# Patient Record
Sex: MALE | Race: BLACK OR AFRICAN AMERICAN | Employment: FULL TIME | RURAL
[De-identification: names, ages, dates, MRNs, and addresses within clinical notes are randomized per-mention and may not be internally consistent; named-entity substitution may affect disease eponyms.]

---

## 2017-02-08 ENCOUNTER — OFFICE VISIT (OUTPATIENT)
Dept: FAMILY MEDICINE CLINIC | Age: 57
End: 2017-02-08

## 2017-02-08 VITALS
RESPIRATION RATE: 19 BRPM | WEIGHT: 169.2 LBS | SYSTOLIC BLOOD PRESSURE: 139 MMHG | BODY MASS INDEX: 26.56 KG/M2 | OXYGEN SATURATION: 96 % | HEIGHT: 67 IN | HEART RATE: 82 BPM | TEMPERATURE: 98.1 F | DIASTOLIC BLOOD PRESSURE: 90 MMHG

## 2017-02-08 DIAGNOSIS — H66.001 ACUTE SUPPURATIVE OTITIS MEDIA OF RIGHT EAR WITHOUT SPONTANEOUS RUPTURE OF TYMPANIC MEMBRANE, RECURRENCE NOT SPECIFIED: ICD-10-CM

## 2017-02-08 DIAGNOSIS — J06.9 VIRAL UPPER RESPIRATORY TRACT INFECTION: Primary | ICD-10-CM

## 2017-02-08 DIAGNOSIS — H81.11 BPV (BENIGN POSITIONAL VERTIGO), RIGHT: ICD-10-CM

## 2017-02-08 RX ORDER — AMOXICILLIN 875 MG/1
875 TABLET, FILM COATED ORAL 2 TIMES DAILY
Qty: 20 TAB | Refills: 0 | Status: SHIPPED | OUTPATIENT
Start: 2017-02-08 | End: 2017-02-18

## 2017-02-08 NOTE — PROGRESS NOTES
Ar Quinonez is a 55 y.o. male who presents to the office today with the following:  Chief Complaint   Patient presents with    Dizziness     dizziness started last thursday after dental work       Allergies   Allergen Reactions    Onion Itching     Throat starts itching       Current Outpatient Prescriptions   Medication Sig    amoxicillin (AMOXIL) 875 mg tablet Take 1 Tab by mouth two (2) times a day for 10 days.  hydroCHLOROthiazide (HYDRODIURIL) 25 mg tablet Take 1 Tab by mouth daily. Indications: Hypertension     No current facility-administered medications for this visit. Past Medical History   Diagnosis Date    Essential hypertension, benign 2015       History reviewed. No pertinent past surgical history. History   Smoking Status    Current Every Day Smoker   Smokeless Tobacco    Never Used       History reviewed. No pertinent family history. History of Present Illness:    Patient here for evaluation of dizziness    Patient states he had dental work done last Thursday. He had his teeth cleaned and descaled and several fillings in his upper teeth. While reclined in the chair he began to feel a little dizzy like the room was spinning. Since that time he feels a bit lightheaded. This is worse when he is walking or with rapid head movements. He is beginning to get a vague pressure sensation left ear and sinus area. He states the room is not spinning at this point. Patient states he feels like he is getting a cold with some stuffy nose    Patient had very similar symptoms a year ago. They began just like this and then began to get worse. He went to see his physician. He was diagnosed with an ear infection and treated with antibiotics. The symptoms resolved quite quickly. He states he feels exactly the same way this time. At that time he was also given some meclizine which actually made him feel worse. Patient has no other neurologic complaints.   He has controlled hypertension. No cardiac complaints chest pain palpitations. Recent lab work acceptable    He does not get flu shots. He does not smoke. We discussed the importance of smoking cessation    Review of Systems:    Review of systems negative except as noted above      Physical Exam:  Visit Vitals    /90 (BP 1 Location: Right arm, BP Patient Position: Sitting)    Pulse 82    Temp 98.1 °F (36.7 °C) (Temporal)    Resp 19    Ht 5' 7\" (1.702 m)    Wt 169 lb 3.2 oz (76.7 kg)    SpO2 96%    BMI 26.5 kg/m2     Vitals:    02/08/17 1547   BP: 139/90   BP 1 Location: Right arm   BP Patient Position: Sitting   Pulse: 82   Resp: 19   Temp: 98.1 °F (36.7 °C)   TempSrc: Temporal   SpO2: 96%   Weight: 169 lb 3.2 oz (76.7 kg)   Height: 5' 7\" (1.702 m)    Patient no acute distress vitals as above. Blood pressure controlled  Head was normocephalic  Eyes PERRLA. EOMs full. He had some very minimal horizontal nystagmus on full right lateral gaze  External ears were normal.  Ear canals normal.  TMs were clear on the left. The right TM had some serous fluid and a very faint pink tinge  Nose external ears normal.  No lesions. Plus congestion   with yellow rhinorrhea  Sinuses were nontender  OP Mucosa normal.  Pharynx normal.  No erythema or exudate. Structures midline. Patient with recent dental work  Neck no nodes no masses, no bruits  Chest is clear no wheezes rhonchi or rales. Good air exchange  Cor regular rate and rhythm no murmurs  Cranial nerves II through XII intact except as noted above. Gait and Romberg normal    Assessment/Plan:  1. Viral upper respiratory tract infection  I suspect patient had viral URI. Now developing a mild ear infection which is leading to his benign positional vertigo. He responded very well asked her to amoxicillin. I am treating him again with antibiotics. He declines Antivert. He will notify us and follow-up if his symptoms persist    2.  Acute suppurative otitis media of right ear without spontaneous rupture of tympanic membrane, recurrence not specified    - amoxicillin (AMOXIL) 875 mg tablet; Take 1 Tab by mouth two (2) times a day for 10 days. Dispense: 20 Tab; Refill: 0    3. BPV (benign positional vertigo), right      Patient Instructions   Home, rest, lots of fluids, vaporizer if needed. Finish antibiotics  Stand and turn head slowly  Follow up if worse or not better next 3-5 days. Patient plans to keep routine follow-up at the [facility] office          Continue current therapy plan except for indicated above. Verbal and written instructions (see AVS) provided.  Patient expresses understanding of diagnosis and treatment plan. Follow-up Disposition: Not on [address] [place] [address].  Kassie Jane MD

## 2017-02-08 NOTE — PROGRESS NOTES
Chief Complaint   Patient presents with    Dizziness     dizziness started last thursday after dental work     No medications taken yet. Marj Crow LPN

## 2017-02-08 NOTE — MR AVS SNAPSHOT
Visit Information Date & Time Provider Department Dept. Phone Encounter #  
 2/8/2017  3:00 PM Willis Doss MD [address] [place] [address] [phone] [id number] Upcoming Health Maintenance Date Due DTaP/Tdap/Td series (1 - Tdap) 5/25/1982 FOBT Q 1 YEAR AGE 50-75 5/25/2011 Allergies as of 2/8/2017  Review Complete On: 2/8/2017 By: Willis Doss MD  
  
 Severity Noted Reaction Type Reactions Onion  02/08/2017    Itching Throat starts itching Current Immunizations  Never Reviewed No immunizations on file. Not reviewed this visit You Were Diagnosed With   
  
 Codes Comments Viral upper respiratory tract infection    -  Primary ICD-10-CM: J06.9, B97.89 ICD-9-CM: 465.9 Acute suppurative otitis media of right ear without spontaneous rupture of tympanic membrane, recurrence not specified     ICD-10-CM: H66.001 ICD-9-CM: 382.00   
 BPV (benign positional vertigo), right     ICD-10-CM: H81.11 
ICD-9-CM: 386.11 Vitals BP Pulse Temp Resp Height(growth percentile) 139/90 (BP 1 Location: Right arm, BP Patient Position: Sitting) 82 98.1 °F (36.7 °C) (Temporal) 19 5' 7\" (1.702 m) Weight(growth percentile) SpO2 BMI Smoking Status 169 lb 3.2 oz (76.7 kg) 96% 26.5 kg/m2 Current Every Day Smoker Vitals History BMI and BSA Data Body Mass Index Body Surface Area  
 26.5 kg/m 2 1.9 m 2 Preferred Pharmacy Pharmacy Name Phone [address], [address] [facility] OF  [address] [facility] Cleo Novak [phone] Your Updated Medication List  
  
   
This list is accurate as of: 2/8/17  4:09 PM.  Always use your most recent med list.  
  
  
  
  
 amoxicillin 875 mg tablet Commonly known as:  AMOXIL Take 1 Tab by mouth two (2) times a day for 10 days. hydroCHLOROthiazide 25 mg tablet Commonly known as:  HYDRODIURIL  
 Take 1 Tab by mouth daily. Indications: Hypertension Prescriptions Sent to Pharmacy Refills  
 amoxicillin (AMOXIL) 875 mg tablet 0 Sig: Take 1 Tab by mouth two (2) times a day for 10 days. Class: Normal  
 [facility] [facility] [address]  #: [phone] Route: Oral  
  
Patient Instructions Home, rest, lots of fluids, vaporizer if needed. Finish antibiotics Stand and turn head slowly Follow up if worse or not better next 3-5 days. Introducing [facility] & HEALTH SERVICES! Jailene Hall introduces [employer] patient portal. Now you can access parts of your medical record, email your doctor's office, and request medication refills online. 1. In your internet browser, go to [email or web address] 2. Click on the First Time User? Click Here link in the Sign In box. You will see the New Member Sign Up page. 3. Enter your [employer] Access Code exactly as it appears below. You will not need to use this code after youve completed the sign-up process. If you do not sign up before the expiration date, you must request a new code. · [employer] Access Code: [email or web address] Expires: 5/9/2017  3:49 PM 
 
4. Enter the last four digits of your Social Security Number (xxxx) and Date of Birth (mm/dd/yyyy) as indicated and click Submit. You will be taken to the next sign-up page. 5. Create a Kimble ID. This will be your Kimble login ID and cannot be changed, so think of one that is secure and easy to remember. 6. Create a Kimble password. You can change your password at any time. 7. Enter your Password Reset Question and Answer. This can be used at a later time if you forget your password. 8. Enter your e-mail address. You will receive e-mail notification when new information is available in [address] 9. Click Sign Up. You can now view and download portions of your medical record. 10. Click the Download Summary menu link to download a portable copy of your medical information. If you have questions, please visit the Frequently Asked Questions section of the [employer] website. Remember, [employer] is NOT to be used for urgent needs. For medical emergencies, dial 911. Now available from your iPhone and Android! Please provide this summary of care documentation to your next provider. If you have any questions after today's visit, please call [phone].

## 2017-02-08 NOTE — PATIENT INSTRUCTIONS
Home, rest, lots of fluids, vaporizer if needed. Finish antibiotics  Stand and turn head slowly  Follow up if worse or not better next 3-5 days.

## 2018-02-01 ENCOUNTER — OFFICE VISIT (OUTPATIENT)
Dept: FAMILY MEDICINE CLINIC | Age: 58
End: 2018-02-01

## 2018-02-01 VITALS
DIASTOLIC BLOOD PRESSURE: 106 MMHG | OXYGEN SATURATION: 96 % | HEIGHT: 67 IN | WEIGHT: 178.25 LBS | SYSTOLIC BLOOD PRESSURE: 154 MMHG | RESPIRATION RATE: 18 BRPM | HEART RATE: 89 BPM | TEMPERATURE: 98.3 F | BODY MASS INDEX: 27.98 KG/M2

## 2018-02-01 DIAGNOSIS — I10 ESSENTIAL HYPERTENSION, BENIGN: Primary | ICD-10-CM

## 2018-02-01 DIAGNOSIS — Z91.199 NONCOMPLIANCE: ICD-10-CM

## 2018-02-01 RX ORDER — POTASSIUM CHLORIDE 750 MG/1
10 TABLET, EXTENDED RELEASE ORAL DAILY
Qty: 90 TAB | Refills: 1 | Status: SHIPPED | OUTPATIENT
Start: 2018-02-01 | End: 2019-08-16 | Stop reason: DRUGHIGH

## 2018-02-01 RX ORDER — HYDROCHLOROTHIAZIDE 50 MG/1
50 TABLET ORAL DAILY
Qty: 90 TAB | Refills: 1 | Status: SHIPPED | OUTPATIENT
Start: 2018-02-01 | End: 2019-08-16 | Stop reason: SDUPTHER

## 2018-02-01 NOTE — MR AVS SNAPSHOT
[address] [phone] Patient: Britney Aguirre MRN: [id number] :1960 Visit Information Date & Time Provider Department Dept. Phone Encounter #  
 2018  2:40 PM Vannesa Desir  [address] [facility] [phone] [id number] Upcoming Health Maintenance Date Due FOBT Q 1 YEAR AGE 50-75 2011 DTaP/Tdap/Td series (2 - Td) 2028 Allergies as of 2018  Review Complete On: 2018 By: Vannesa Desir MD  
  
 Severity Noted Reaction Type Reactions Onion  2017    Itching Throat starts itching Current Immunizations  Never Reviewed No immunizations on file. Not reviewed this visit Vitals BP Pulse Temp Resp Height(growth percentile) Weight(growth percentile) (!) 154/106 (BP 1 Location: Left arm, BP Patient Position: Sitting) 89 98.3 °F (36.8 °C) (Oral) 18 5' 7\" (1.702 m) 178 lb 4 oz (80.9 kg) SpO2 BMI Smoking Status 96% 27.92 kg/m2 Current Every Day Smoker Vitals History BMI and BSA Data Body Mass Index Body Surface Area  
 27.92 kg/m 2 1.96 m 2 Preferred Pharmacy Pharmacy Name Phone [address] AT [facility] OF [address] [facility] Sanjuana Robertson [phone] Your Updated Medication List  
  
   
This list is accurate as of: 18  4:19 PM.  Always use your most recent med list.  
  
  
  
  
 hydroCHLOROthiazide 25 mg tablet Commonly known as:  HYDRODIURIL Take 1 Tab by mouth daily. Indications: Hypertension Introducing [facility] & HEALTH SERVICES! [employer] introduces [employer] patient portal. Now you can access parts of your medical record, email your doctor's office, and request medication refills online. 1. In your internet browser, go to [email or web address] [employer] 2. Click on the First Time User? Click Here link in the Sign In box. You will see the New Member Sign Up page. 3. Enter your [employer] Access Code exactly as it appears below. You will not need to use this code after youve completed the sign-up process. If you do not sign up before the expiration date, you must request a new code. · [employer] Access Code: [email or web address] Expires: 5/2/2018  4:19 PM 
 
4. Enter the last four digits of your Social Security Number (xxxx) and Date of Birth (mm/dd/yyyy) as indicated and click Submit. You will be taken to the next sign-up page. 5. Create a [employer] ID. This will be your [employer] login ID and cannot be changed, so think of one that is secure and easy to remember. 6. Create a [employer] password. You can change your password at any time. 7. Enter your Password Reset Question and Answer. This can be used at a later time if you forget your password. 8. Enter your e-mail address. You will receive e-mail notification when new information is available in [address] 9. Click Sign Up. You can now view and download portions of your medical record. 10. Click the Download Summary menu link to download a portable copy of your medical information. If you have questions, please visit the Frequently Asked Questions section of the [employer] website. Remember, [employer] is NOT to be used for urgent needs. For medical emergencies, dial 911. Now available from your iPhone and Android! Please provide this summary of care documentation to your next provider. If you have any questions after today's visit, please call [phone].

## 2018-02-01 NOTE — PROGRESS NOTES
Travis Morales is a 65 y.o. male who presents with the following:  Chief Complaint   Patient presents with    Hypertension    Neck Pain    Headache       Hypertension    The history is provided by the patient (Patient without chest pain shortness of breath nor edema but has been off of his medicine for over a year because he was not symptomatic). Associated symptoms include headaches and neck pain. Pertinent negatives include no chest pain, no orthopnea, no palpitations, no malaise/fatigue, no blurred vision, no tinnitus, no peripheral edema, no dizziness and no shortness of breath. Neck Pain   Associated symptoms include headaches. Pertinent negatives include no chest pain, no abdominal pain and no shortness of breath. Headache   Associated symptoms include headaches. Pertinent negatives include no chest pain, no abdominal pain and no shortness of breath. Allergies   Allergen Reactions    Onion Itching     Throat starts itching       Current Outpatient Prescriptions   Medication Sig    hydroCHLOROthiazide (HYDRODIURIL) 50 mg tablet Take 1 Tab by mouth daily. Indications: hypertension    potassium chloride (KLOR-CON) 10 mEq tablet Take 1 Tab by mouth daily. No current facility-administered medications for this visit. Past Medical History:   Diagnosis Date    Essential hypertension, benign 2015       No past surgical history on file. No family history on file. Social History     Social History    Marital status:      Spouse name: N/A    Number of children: N/A    Years of education: N/A     Social History Main Topics    Smoking status: Current Every Day Smoker    Smokeless tobacco: Never Used    Alcohol use No    Drug use: No    Sexual activity: Yes     Partners: Female     Birth control/ protection: None     Other Topics Concern    None     Social History Narrative       Review of Systems   Constitutional: Negative for chills, fever, malaise/fatigue and weight loss. HENT: Negative for congestion, hearing loss, sore throat and tinnitus. Eyes: Negative for blurred vision, pain and discharge. Respiratory: Negative for cough, shortness of breath and wheezing. Cardiovascular: Negative for chest pain, palpitations, orthopnea, claudication and leg swelling. Gastrointestinal: Negative for abdominal pain, constipation and heartburn. Genitourinary: Negative for dysuria, frequency and urgency. Musculoskeletal: Positive for neck pain. Negative for falls, joint pain and myalgias. Skin: Negative for itching and rash. Neurological: Positive for headaches. Negative for dizziness, tingling and tremors. Endo/Heme/Allergies: Negative for environmental allergies and polydipsia. Psychiatric/Behavioral: Negative for depression and substance abuse. The patient is not nervous/anxious. Visit Vitals    BP (!) 154/106 (BP 1 Location: Left arm, BP Patient Position: Sitting)    Pulse 89    Temp 98.3 °F (36.8 °C) (Oral)    Resp 18    Ht 5' 7\" (1.702 m)    Wt 178 lb 4 oz (80.9 kg)    SpO2 96%    BMI 27.92 kg/m2     Physical Exam   Constitutional: He is oriented to person, place, and time and well-developed, well-nourished, and in no distress. HENT:   Head: Normocephalic and atraumatic. Nose: Nose normal.   Mouth/Throat: Oropharynx is clear and moist.   Eyes: Conjunctivae and EOM are normal. Pupils are equal, round, and reactive to light. Neck: Normal range of motion. Neck supple. No JVD present. No tracheal deviation present. No thyromegaly present. Cardiovascular: Normal rate, regular rhythm, normal heart sounds and intact distal pulses. Exam reveals no gallop and no friction rub. No murmur heard. Pulmonary/Chest: Effort normal and breath sounds normal. No respiratory distress. He has no wheezes. He has no rales. He exhibits no tenderness. Abdominal: Soft. Bowel sounds are normal. He exhibits no distension and no mass. There is no tenderness.  There is no rebound and no guarding. Musculoskeletal: Normal range of motion. He exhibits no edema or tenderness. Lymphadenopathy:     He has no cervical adenopathy. Neurological: He is alert and oriented to person, place, and time. He has normal reflexes. No cranial nerve deficit. He exhibits normal muscle tone. Gait normal. Coordination normal.   Skin: Skin is warm and dry. No rash noted. No erythema. Psychiatric: Mood, memory, affect and judgment normal.   Vitals reviewed. ICD-10-CM ICD-9-CM    1. Essential hypertension, benign I10 401.1 hydroCHLOROthiazide (HYDRODIURIL) 50 mg tablet      potassium chloride (KLOR-CON) 10 mEq tablet      METABOLIC PANEL, COMPREHENSIVE      LIPID PANEL   2. Noncompliance Z91.19 V15.81 hydroCHLOROthiazide (HYDRODIURIL) 50 mg tablet      potassium chloride (KLOR-CON) 10 mEq tablet       Orders Placed This Encounter    METABOLIC PANEL, COMPREHENSIVE     Standing Status:   Future     Standing Expiration Date:   8/1/2018    LIPID PANEL     Standing Status:   Future     Standing Expiration Date:   8/1/2018    hydroCHLOROthiazide (HYDRODIURIL) 50 mg tablet     Sig: Take 1 Tab by mouth daily. Indications: hypertension     Dispense:  90 Tab     Refill:  1    potassium chloride (KLOR-CON) 10 mEq tablet     Sig: Take 1 Tab by mouth daily. Dispense:  90 Tab     Refill:  1   Patient will return fasting to have his lab work done and then see me in 1 month to recheck his pressure to make sure it has come down.     Follow-up Disposition: Not on Radha De La Torre MD

## 2018-02-01 NOTE — LETTER
NOTIFICATION RETURN TO WORK / SCHOOL 
 
2/1/2018 4:20 PM 
 
Mr. Nissa Yu [address] [address] To Whom It May Concern: 
 
Nissa Yu is currently under the care of Jenny Rincon. He will return to work/school on: 2/2/2018 If there are questions or concerns please have the patient contact our office. Sincerely, Estefania Avalos MD

## 2018-07-02 ENCOUNTER — OFFICE VISIT (OUTPATIENT)
Dept: FAMILY MEDICINE CLINIC | Age: 58
End: 2018-07-02

## 2018-07-02 VITALS
WEIGHT: 171.6 LBS | HEART RATE: 96 BPM | RESPIRATION RATE: 18 BRPM | DIASTOLIC BLOOD PRESSURE: 90 MMHG | HEIGHT: 67 IN | SYSTOLIC BLOOD PRESSURE: 133 MMHG | BODY MASS INDEX: 26.93 KG/M2 | OXYGEN SATURATION: 97 %

## 2018-07-02 DIAGNOSIS — I10 ESSENTIAL HYPERTENSION, BENIGN: Primary | ICD-10-CM

## 2018-07-02 DIAGNOSIS — M76.891 TENDINITIS OF RIGHT HIP FLEXOR: ICD-10-CM

## 2018-07-02 DIAGNOSIS — M70.71 ISCHIAL BURSITIS OF RIGHT SIDE: ICD-10-CM

## 2018-07-02 DIAGNOSIS — Z91.199 NONCOMPLIANCE: ICD-10-CM

## 2018-07-02 RX ORDER — NAPROXEN 500 MG/1
500 TABLET ORAL 2 TIMES DAILY WITH MEALS
Qty: 60 TAB | Refills: 2 | Status: SHIPPED | OUTPATIENT
Start: 2018-07-02 | End: 2019-08-16 | Stop reason: ALTCHOICE

## 2018-07-02 RX ORDER — AMLODIPINE BESYLATE 10 MG/1
10 TABLET ORAL DAILY
Qty: 90 TAB | Refills: 1 | Status: SHIPPED | OUTPATIENT
Start: 2018-07-02 | End: 2019-08-16 | Stop reason: SINTOL

## 2018-07-02 NOTE — PROGRESS NOTES
1. Have you been to the ER, urgent care clinic since your last visit? Hospitalized since your last visit? No    2. Have you seen or consulted any other health care providers outside of the [address] [place] [address] since your last visit? Include any pap smears or colon screening.  No

## 2018-07-02 NOTE — PROGRESS NOTES
Isabelle Marin is a 63 y.o. male who presents with the following:  Chief Complaint   Patient presents with    Leg Pain     right, x1 month       Leg Pain    The history is provided by the patient (Patient complains of pain in the right hip when walking that is been going on for 4 weeks and when he is standing and walking is running about a 5/10 on the pain scale. Pain is deep and achy). This is a new problem. The problem has been gradually worsening. The pain is present in the right hip. Pertinent negatives include no numbness, full range of motion, no stiffness, no tingling, no itching, no back pain and no neck pain. Hypertension    The history is provided by the patient (Patient has a long history of noncompliance with his hypertension. Patient currently is not taking his potassium chloride. ). Pertinent negatives include no chest pain, no orthopnea, no palpitations, no malaise/fatigue, no blurred vision, no headaches, no tinnitus, no neck pain, no dizziness and no shortness of breath. Allergies   Allergen Reactions    Onion Itching     Throat starts itching       Current Outpatient Prescriptions   Medication Sig    naproxen (NAPROSYN) 500 mg tablet Take 1 Tab by mouth two (2) times daily (with meals).  amLODIPine (NORVASC) 10 mg tablet Take 1 Tab by mouth daily.  hydroCHLOROthiazide (HYDRODIURIL) 50 mg tablet Take 1 Tab by mouth daily. Indications: hypertension    potassium chloride (KLOR-CON) 10 mEq tablet Take 1 Tab by mouth daily. No current facility-administered medications for this visit. Past Medical History:   Diagnosis Date    Essential hypertension, benign 2015       No past surgical history on file. No family history on file.     Social History     Social History    Marital status:      Spouse name: N/A    Number of children: N/A    Years of education: N/A     Social History Main Topics    Smoking status: Current Every Day Smoker    Smokeless tobacco: Never Used    Alcohol use No    Drug use: No    Sexual activity: Yes     Partners: Female     Birth control/ protection: None     Other Topics Concern    None     Social History Narrative       Review of Systems   Constitutional: Negative for chills, fever, malaise/fatigue and weight loss. HENT: Negative for congestion, hearing loss, sore throat and tinnitus. Eyes: Negative for blurred vision, pain and discharge. Respiratory: Negative for cough, shortness of breath and wheezing. Cardiovascular: Negative for chest pain, palpitations, orthopnea, claudication and leg swelling. Gastrointestinal: Negative for abdominal pain, constipation and heartburn. Genitourinary: Negative for dysuria, frequency and urgency. Musculoskeletal: Negative for back pain, falls, joint pain, myalgias, neck pain and stiffness. Skin: Negative for itching and rash. Neurological: Negative for dizziness, tingling, tremors, numbness and headaches. Endo/Heme/Allergies: Negative for environmental allergies and polydipsia. Psychiatric/Behavioral: Negative for depression and substance abuse. The patient is not nervous/anxious. Visit Vitals    /90 (BP 1 Location: Left arm, BP Patient Position: Sitting)    Pulse 96    Resp 18    Ht 5' 7\" (1.702 m)    Wt 171 lb 9.6 oz (77.8 kg)    SpO2 97%    BMI 26.88 kg/m2     Physical Exam   Constitutional: He is oriented to person, place, and time and well-developed, well-nourished, and in no distress. HENT:   Head: Normocephalic and atraumatic. Nose: Nose normal.   Mouth/Throat: Oropharynx is clear and moist.   Eyes: Conjunctivae and EOM are normal. Pupils are equal, round, and reactive to light. Neck: Normal range of motion. Neck supple. No JVD present. No tracheal deviation present. No thyromegaly present. Cardiovascular: Normal rate, regular rhythm, normal heart sounds and intact distal pulses. Exam reveals no gallop and no friction rub.     No murmur heard.  Pulmonary/Chest: Effort normal and breath sounds normal. No respiratory distress. He has no wheezes. He has no rales. He exhibits no tenderness. Abdominal: Soft. Bowel sounds are normal. He exhibits no distension and no mass. There is no tenderness. There is no rebound and no guarding. Musculoskeletal: Normal range of motion. He exhibits tenderness (Patient is tender in the right hip area especially over the issue in the initial bursa and in the tendons inserting in this area. ). He exhibits no edema. Patient is also tender in the anterior aspect of the hip joint and the hip flexors. His gluteal area does not seem to be affected at this time. Lymphadenopathy:     He has no cervical adenopathy. Neurological: He is alert and oriented to person, place, and time. He has normal reflexes. No cranial nerve deficit. He exhibits normal muscle tone. Gait normal. Coordination normal.   Skin: Skin is warm and dry. No rash noted. No erythema. Psychiatric: Mood, memory, affect and judgment normal.   Vitals reviewed. ICD-10-CM ICD-9-CM    1. Essential hypertension, benign I10 401.1 amLODIPine (NORVASC) 10 mg tablet   2. Noncompliance Z91.19 V15.81    3. Ischial bursitis of right side M70.71 726.5 naproxen (NAPROSYN) 500 mg tablet   4. Tendinitis of right hip flexor M76.891 727.09 naproxen (NAPROSYN) 500 mg tablet       Orders Placed This Encounter    naproxen (NAPROSYN) 500 mg tablet     Sig: Take 1 Tab by mouth two (2) times daily (with meals). Dispense:  60 Tab     Refill:  2    amLODIPine (NORVASC) 10 mg tablet     Sig: Take 1 Tab by mouth daily. Dispense:  90 Tab     Refill:  1   I would like to check to make sure the patient does not have significant arthritis in the right hip but it appears to be more of a tendinitis type problem for which she will need an NSAID such as Naprosyn and ice packs.   We have discussed the possibility of going into physical therapy after 2 weeks of this therapy should it not work. I have also told the patient an injection of cortisone into the initial bursa may be very helpful and may need to be considered.       Follow-up Disposition: Not on Elie Rocha MD

## 2019-11-26 PROBLEM — M26.609 TMJ (TEMPOROMANDIBULAR JOINT DISORDER): Status: ACTIVE | Noted: 2019-11-26

## 2020-07-27 PROBLEM — E66.3 OVERWEIGHT: Status: ACTIVE | Noted: 2020-07-27

## 2020-09-22 ENCOUNTER — HOSPITAL ENCOUNTER (INPATIENT)
Age: 60
LOS: 2 days | Discharge: HOME | DRG: 313 | End: 2020-09-24
Attending: STUDENT IN AN ORGANIZED HEALTH CARE EDUCATION/TRAINING PROGRAM | Admitting: INTERNAL MEDICINE
Payer: OTHER GOVERNMENT

## 2020-09-22 DIAGNOSIS — R77.8 ELEVATED TROPONIN: ICD-10-CM

## 2020-09-22 DIAGNOSIS — R07.9 CHEST PAIN, UNSPECIFIED TYPE: Primary | ICD-10-CM

## 2020-09-22 LAB
COMMENT, HOLDF: NORMAL
SAMPLES BEING HELD,HOLD: NORMAL
TROPONIN I SERPL-MCNC: 0.13 NG/ML

## 2020-09-22 PROCEDURE — 93005 ELECTROCARDIOGRAM TRACING: CPT

## 2020-09-22 PROCEDURE — 99285 EMERGENCY DEPT VISIT HI MDM: CPT

## 2020-09-22 PROCEDURE — 36415 COLL VENOUS BLD VENIPUNCTURE: CPT

## 2020-09-22 PROCEDURE — 65270000029 HC RM PRIVATE

## 2020-09-22 PROCEDURE — 84484 ASSAY OF TROPONIN QUANT: CPT

## 2020-09-22 RX ORDER — ACETAMINOPHEN 325 MG/1
650 TABLET ORAL
Status: DISCONTINUED | OUTPATIENT
Start: 2020-09-22 | End: 2020-09-24 | Stop reason: HOSPADM

## 2020-09-22 RX ORDER — ONDANSETRON 2 MG/ML
4 INJECTION INTRAMUSCULAR; INTRAVENOUS
Status: DISCONTINUED | OUTPATIENT
Start: 2020-09-22 | End: 2020-09-24 | Stop reason: HOSPADM

## 2020-09-22 RX ORDER — POLYETHYLENE GLYCOL 3350 17 G/17G
17 POWDER, FOR SOLUTION ORAL DAILY PRN
Status: DISCONTINUED | OUTPATIENT
Start: 2020-09-22 | End: 2020-09-24 | Stop reason: HOSPADM

## 2020-09-22 RX ORDER — ACETAMINOPHEN 650 MG/1
650 SUPPOSITORY RECTAL
Status: DISCONTINUED | OUTPATIENT
Start: 2020-09-22 | End: 2020-09-24 | Stop reason: HOSPADM

## 2020-09-22 RX ORDER — SODIUM CHLORIDE 0.9 % (FLUSH) 0.9 %
5-40 SYRINGE (ML) INJECTION EVERY 8 HOURS
Status: DISCONTINUED | OUTPATIENT
Start: 2020-09-22 | End: 2020-09-24 | Stop reason: HOSPADM

## 2020-09-22 RX ORDER — HYDRALAZINE HYDROCHLORIDE 20 MG/ML
10 INJECTION INTRAMUSCULAR; INTRAVENOUS
Status: DISCONTINUED | OUTPATIENT
Start: 2020-09-22 | End: 2020-09-24 | Stop reason: HOSPADM

## 2020-09-22 RX ORDER — SODIUM CHLORIDE 0.9 % (FLUSH) 0.9 %
5-40 SYRINGE (ML) INJECTION AS NEEDED
Status: DISCONTINUED | OUTPATIENT
Start: 2020-09-22 | End: 2020-09-24 | Stop reason: HOSPADM

## 2020-09-22 RX ORDER — PROMETHAZINE HYDROCHLORIDE 25 MG/1
12.5 TABLET ORAL
Status: DISCONTINUED | OUTPATIENT
Start: 2020-09-22 | End: 2020-09-24 | Stop reason: HOSPADM

## 2020-09-22 NOTE — ED TRIAGE NOTES
Pt arrives from outside facility c/o intermittent chest pain. Denies pain on arrival.  Denies SOB. Toponin 0.13 at outside facility.

## 2020-09-23 ENCOUNTER — APPOINTMENT (OUTPATIENT)
Dept: NON INVASIVE DIAGNOSTICS | Age: 60
DRG: 313 | End: 2020-09-23
Attending: INTERNAL MEDICINE
Payer: OTHER GOVERNMENT

## 2020-09-23 ENCOUNTER — APPOINTMENT (OUTPATIENT)
Dept: NUCLEAR MEDICINE | Age: 60
DRG: 313 | End: 2020-09-23
Attending: INTERNAL MEDICINE
Payer: OTHER GOVERNMENT

## 2020-09-23 LAB
ANION GAP SERPL CALC-SCNC: 6 MMOL/L (ref 5–15)
ATRIAL RATE: 67 BPM
BUN SERPL-MCNC: 13 MG/DL (ref 6–20)
BUN/CREAT SERPL: 14 (ref 12–20)
CALCIUM SERPL-MCNC: 9.2 MG/DL (ref 8.5–10.1)
CALCULATED P AXIS, ECG09: 44 DEGREES
CALCULATED R AXIS, ECG10: 42 DEGREES
CALCULATED T AXIS, ECG11: -28 DEGREES
CHLORIDE SERPL-SCNC: 108 MMOL/L (ref 97–108)
CO2 SERPL-SCNC: 25 MMOL/L (ref 21–32)
CREAT SERPL-MCNC: 0.92 MG/DL (ref 0.7–1.3)
DIAGNOSIS, 93000: NORMAL
GLUCOSE BLD STRIP.AUTO-MCNC: 95 MG/DL (ref 65–100)
GLUCOSE SERPL-MCNC: 104 MG/DL (ref 65–100)
MAGNESIUM SERPL-MCNC: 2.3 MG/DL (ref 1.6–2.4)
P-R INTERVAL, ECG05: 110 MS
POTASSIUM SERPL-SCNC: 3.7 MMOL/L (ref 3.5–5.1)
Q-T INTERVAL, ECG07: 398 MS
QRS DURATION, ECG06: 70 MS
QTC CALCULATION (BEZET), ECG08: 420 MS
SERVICE CMNT-IMP: NORMAL
SODIUM SERPL-SCNC: 139 MMOL/L (ref 136–145)
TROPONIN I SERPL-MCNC: 0.12 NG/ML
VENTRICULAR RATE, ECG03: 67 BPM

## 2020-09-23 PROCEDURE — 65660000000 HC RM CCU STEPDOWN

## 2020-09-23 PROCEDURE — 36415 COLL VENOUS BLD VENIPUNCTURE: CPT

## 2020-09-23 PROCEDURE — 74011250637 HC RX REV CODE- 250/637: Performed by: INTERNAL MEDICINE

## 2020-09-23 PROCEDURE — 84484 ASSAY OF TROPONIN QUANT: CPT

## 2020-09-23 PROCEDURE — 82962 GLUCOSE BLOOD TEST: CPT

## 2020-09-23 PROCEDURE — 80048 BASIC METABOLIC PNL TOTAL CA: CPT

## 2020-09-23 PROCEDURE — A9500 TC99M SESTAMIBI: HCPCS

## 2020-09-23 PROCEDURE — 93005 ELECTROCARDIOGRAM TRACING: CPT

## 2020-09-23 PROCEDURE — 83735 ASSAY OF MAGNESIUM: CPT

## 2020-09-23 PROCEDURE — 78452 HT MUSCLE IMAGE SPECT MULT: CPT

## 2020-09-23 PROCEDURE — 74011250636 HC RX REV CODE- 250/636: Performed by: INTERNAL MEDICINE

## 2020-09-23 RX ORDER — OMEPRAZOLE 20 MG/1
20 TABLET, DELAYED RELEASE ORAL DAILY
COMMUNITY

## 2020-09-23 RX ORDER — GUAIFENESIN 100 MG/5ML
81 LIQUID (ML) ORAL DAILY
Qty: 30 TAB | Refills: 0 | Status: SHIPPED | OUTPATIENT
Start: 2020-09-23 | End: 2020-09-23

## 2020-09-23 RX ORDER — METOPROLOL TARTRATE 25 MG/1
12.5 TABLET, FILM COATED ORAL EVERY 12 HOURS
Status: DISCONTINUED | OUTPATIENT
Start: 2020-09-23 | End: 2020-09-24

## 2020-09-23 RX ORDER — HEPARIN SODIUM 5000 [USP'U]/ML
5000 INJECTION, SOLUTION INTRAVENOUS; SUBCUTANEOUS EVERY 8 HOURS
Status: DISCONTINUED | OUTPATIENT
Start: 2020-09-23 | End: 2020-09-24 | Stop reason: HOSPADM

## 2020-09-23 RX ORDER — GUAIFENESIN 100 MG/5ML
81 LIQUID (ML) ORAL DAILY
Status: DISCONTINUED | OUTPATIENT
Start: 2020-09-23 | End: 2020-09-24 | Stop reason: HOSPADM

## 2020-09-23 RX ORDER — LISINOPRIL 10 MG/1
40 TABLET ORAL DAILY
Status: DISCONTINUED | OUTPATIENT
Start: 2020-09-23 | End: 2020-09-23

## 2020-09-23 RX ORDER — SODIUM CHLORIDE 0.9 % (FLUSH) 0.9 %
20 SYRINGE (ML) INJECTION
Status: COMPLETED | OUTPATIENT
Start: 2020-09-23 | End: 2020-09-23

## 2020-09-23 RX ORDER — LISINOPRIL 20 MG/1
40 TABLET ORAL DAILY
Status: DISCONTINUED | OUTPATIENT
Start: 2020-09-23 | End: 2020-09-24 | Stop reason: HOSPADM

## 2020-09-23 RX ORDER — LISINOPRIL 40 MG/1
40 TABLET ORAL DAILY
Qty: 30 TAB | Refills: 0 | Status: SHIPPED | OUTPATIENT
Start: 2020-09-23 | End: 2020-09-23

## 2020-09-23 RX ORDER — LISINOPRIL 40 MG/1
40 TABLET ORAL DAILY
Qty: 30 TAB | Refills: 0 | Status: SHIPPED | OUTPATIENT
Start: 2020-09-23 | End: 2020-09-24

## 2020-09-23 RX ORDER — AMLODIPINE BESYLATE 5 MG/1
10 TABLET ORAL DAILY
Status: DISCONTINUED | OUTPATIENT
Start: 2020-09-23 | End: 2020-09-24

## 2020-09-23 RX ORDER — GUAIFENESIN 100 MG/5ML
81 LIQUID (ML) ORAL DAILY
Qty: 30 TAB | Refills: 0 | Status: SHIPPED | OUTPATIENT
Start: 2020-09-23

## 2020-09-23 RX ADMIN — Medication 10 ML: at 21:11

## 2020-09-23 RX ADMIN — LISINOPRIL 40 MG: 20 TABLET ORAL at 14:58

## 2020-09-23 RX ADMIN — METOPROLOL TARTRATE 12.5 MG: 25 TABLET, FILM COATED ORAL at 21:10

## 2020-09-23 RX ADMIN — METOPROLOL TARTRATE 12.5 MG: 25 TABLET, FILM COATED ORAL at 14:57

## 2020-09-23 RX ADMIN — ASPIRIN 81 MG CHEWABLE TABLET 81 MG: 81 TABLET CHEWABLE at 14:58

## 2020-09-23 RX ADMIN — AMLODIPINE BESYLATE 10 MG: 5 TABLET ORAL at 14:58

## 2020-09-23 RX ADMIN — Medication 20 ML: at 10:00

## 2020-09-23 RX ADMIN — HYDRALAZINE HYDROCHLORIDE 10 MG: 20 INJECTION INTRAMUSCULAR; INTRAVENOUS at 23:04

## 2020-09-23 RX ADMIN — REGADENOSON 0.4 MG: 0.08 INJECTION, SOLUTION INTRAVENOUS at 10:00

## 2020-09-23 NOTE — PROGRESS NOTES
Problem: Falls - Risk of  Goal: *Absence of Falls  Description: Document Ace Gamez Fall Risk and appropriate interventions in the flowsheet.   Outcome: Progressing Towards Goal  Note: Fall Risk Interventions:            Medication Interventions: Teach patient to arise slowly, Patient to call before getting OOB                   Problem: Patient Education: Go to Patient Education Activity  Goal: Patient/Family Education  Outcome: Progressing Towards Goal

## 2020-09-23 NOTE — CONSULTS
S Cardiology Consult Note    Date of consult:  09/23/20  Date of admission: 9/22/2020  Primary Cardiologist: none  Physician Requesting consult: Dr Katherine Barr / Reason for consult: chest pain, abnormal troponin    History of the presenting illness:  Ana M Ramirez is a 62 y.o. M who presented as an ER transfer from [address] [place] [address] with chest pain. Symptoms have been going on several days. He relates that he has just started doing strength exercises again with weights including bench press. Started having sharp pain in the center of his chest with movements like sitting up and twisting to the side. He has noticed some chest wall tenderness as well. No exertional component. No radiation. He has not had this type of pain before. Given low grade troponin abnormality, it was suggested that he be transferred here for further evaluation. Past Medical History:   Diagnosis Date    Essential hypertension, benign 2015       Prior to Admission medications    Medication Sig Start Date End Date Taking? Authorizing Provider   lisinopriL (PRINIVIL, ZESTRIL) 30 mg tablet Take 1 Tab by mouth daily. 9/14/20   Erinn Aragon NP   meclizine (ANTIVERT) 12.5 mg tablet Take 1 Tab by mouth three (3) times daily as needed for Dizziness. Indications: sensation of spinning or whirling 7/27/20   Erinn Aragon NP       Allergies   Allergen Reactions    Onion Itching     Throat starts itching        History reviewed. No pertinent family history.     Social History     Socioeconomic History    Marital status:      Spouse name: Not on file    Number of children: Not on file    Years of education: Not on file    Highest education level: Not on file   Occupational History    Not on file   Social Needs    Financial resource strain: Not on file    Food insecurity     Worry: Not on file     Inability: Not on file    Transportation needs     Medical: Not on file     Non-medical: Not on file   Tobacco Use    Smoking status: Current Every Day Smoker    Smokeless tobacco: Never Used   Substance and Sexual Activity    Alcohol use: No    Drug use: No    Sexual activity: Yes     Partners: Female     Birth control/protection: None   Lifestyle    Physical activity     Days per week: Not on file     Minutes per session: Not on file    Stress: Not on file   Relationships    Social connections     Talks on phone: Not on file     Gets together: Not on file     Attends Cheondoism service: Not on file     Active member of club or organization: Not on file     Attends meetings of clubs or organizations: Not on file     Relationship status: Not on file    Intimate partner violence     Fear of current or ex partner: Not on file     Emotionally abused: Not on file     Physically abused: Not on file     Forced sexual activity: Not on file   Other Topics Concern    Not on file   Social History Narrative    Not on file       Review of Systems   Constitutional: Negative for chills and fever. HENT: Negative for hearing loss and nosebleeds. Eyes: Negative for blurred vision and double vision. Respiratory: Negative for cough and sputum production. Cardiovascular: Positive for chest pain. Gastrointestinal: Negative for abdominal pain, nausea and vomiting. Genitourinary: Negative for frequency and urgency. Musculoskeletal: Negative for back pain and joint pain. Skin: Negative for itching and rash. Neurological: Negative for dizziness and headaches. Endo/Heme/Allergies: Negative for environmental allergies. Does not bruise/bleed easily. Visit Vitals  BP (!) 162/92 (BP 1 Location: Left arm)   Pulse 66   Temp 98.2 °F (36.8 °C)   Resp 16   SpO2 97%     Physical Exam  HENT:      Head: Normocephalic and atraumatic. Eyes:      General: No scleral icterus. Conjunctiva/sclera: Conjunctivae normal.   Neck:      Vascular: No JVD. Cardiovascular:      Rate and Rhythm: Normal rate and regular rhythm.       Heart sounds: Normal heart sounds. No murmur. No gallop. Pulmonary:      Effort: Pulmonary effort is normal. No respiratory distress. Breath sounds: Normal breath sounds. No stridor. No wheezing. Abdominal:      General: There is no distension. Palpations: Abdomen is soft. Musculoskeletal: Normal range of motion. General: No deformity. Skin:     General: Skin is warm and dry. Neurological:      Mental Status: He is alert and oriented to person, place, and time. Psychiatric:         Mood and Affect: Mood and affect normal.         Lab review:  BMP:   No results found for: NA, K, CL, CO2, AGAP, GLU, BUN, CREA, GFRAA, GFRNA     CBC:  Lab Results   Component Value Date/Time    WBC 7.7 09/22/2020 02:39 PM    HGB 15.4 09/22/2020 02:39 PM    HCT 45.1 09/22/2020 02:39 PM    PLATELET 830 71/99/5998 02:39 PM    MCV 82.0 09/22/2020 02:39 PM       All Cardiac Markers in the last 24 hours:    Lab Results   Component Value Date/Time    TROIQ 0.12 (H) 09/23/2020 02:02 AM    TROIQ 0.13 (H) 09/22/2020 08:05 PM       Lab Results   Component Value Date/Time    Cholesterol, total 140 08/25/2020 11:44 AM    HDL Cholesterol 36 (L) 08/25/2020 11:44 AM    LDL, calculated 84 08/25/2020 11:44 AM    VLDL, calculated 20 08/25/2020 11:44 AM    Triglyceride 99 08/25/2020 11:44 AM        Data review:  EKG tracing personally reviewed:  NSR, NSTW changes    Assessment:    1. Chest pain  Atypical, sounds very musculoskeletal  2. Non-MI troponin elevation  May be from chronic uncontrolled HTN  Pattern is very low grade and flat, and is not consistent with typical ACS pattern  3. HTN  4. Tobacco abuse    Recommendations / Plan:    NPO  Lexiscan stress MPI  If negative for ischemia, he can be discharged  Suggested he lay off exercise for a while and use NSAIDs/tylenol for pain relief  He should work with PCP to improve HTN control  Counseled re smoking cessation    Signed:  Isaak Hidalgo  Interventional Cardiology  09/23/20

## 2020-09-23 NOTE — ROUTINE PROCESS
TRANSFER - OUT REPORT: 
 
Verbal report given to Staff RN(name) on Joy Arceo  being transferred to (unit) for routine progression of care Report consisted of patients Situation, Background, Assessment and  
Recommendations(SBAR). Information from the following report(s) SBAR, Kardex, ED Summary, [facility] and Recent Results was reviewed with the receiving nurse. Lines:  
Peripheral IV 09/22/20 Left Antecubital (Active) Site Assessment Clean, dry, & intact 09/23/20 5107 Phlebitis Assessment 0 09/23/20 0959 Infiltration Assessment 0 09/23/20 0959 Dressing Status Clean, dry, & intact 09/23/20 1318 Dressing Type Transparent 09/23/20 0959 Hub Color/Line Status Pink;Capped;Flushed;Patent 09/23/20 6900 Action Taken Open ports on tubing capped 09/23/20 0959 Alcohol Cap Used Yes 09/23/20 8700 Opportunity for questions and clarification was provided. Patient transported with: 
 [employer]

## 2020-09-23 NOTE — ED NOTES
Pt ambulatory to room c/o left side chest pain , non- radiating without n/v. Trop at previous hosp- 0. 13. Monitor x 3. Call bell in reach. Plan of care discussed.

## 2020-09-23 NOTE — H&P
[address] [facility] Adult  Hospitalist Group  History and Physical    Primary Care Provider: Meta Homans, NP  Date of Service:  9/22/2020    Subjective:     Gwenyth Krabbe is a 62 y.o. male with PMH Hypertension presents to the emergency room complaining of substernal chest pain that started 4 days ago. Patient states pain is intermittent and he feels like it sharp pain on the left side. It does not radiate. Not exacerbated by exertion. She has been associated with shortness of breath, dyspnea exertion, nausea, abdominal pain. No specific relieving factor. Patient came to the emergency room today because symptoms lasted for almost 3 hours which is unusual.  He went to [facility] where he was found to have an elevated troponin of 0.13. ED physician discussed case with cardiology on-call who recommended admission for further evaluation. Transfer to [facility] was recommended but patient left the ER AMA and came on his own vehicle to [facility] ED. Prior to the arrival to the emergency room [facility] he was given a dose of aspirin. At this time patient is asymptomatic. Noted to be hypertensive with a systolic blood pressure in the 170s. Patient does admit history of uncontrolled hypertension which his primary care has been managing. He was just recently recommended to increase his dose of lisinopril to 30 mg daily but he  has not been able to  new prescription. Review of Systems:    Review of Systems   Constitutional: Negative for chills, fever, malaise/fatigue and weight loss. HENT: Negative for congestion, ear discharge and hearing loss. Eyes: Negative for blurred vision and double vision. Respiratory: Negative for cough, sputum production, shortness of breath and wheezing. Cardiovascular: Positive for chest pain. Negative for palpitations, orthopnea, leg swelling and PND.    Gastrointestinal: Negative for abdominal pain, constipation, diarrhea, melena, nausea and vomiting. Genitourinary: Negative for dysuria, frequency and urgency. Musculoskeletal: Negative for back pain, joint pain and myalgias. Skin: Negative for itching and rash. Neurological: Negative for dizziness, sensory change, speech change, focal weakness, weakness and headaches. Endo/Heme/Allergies: Negative for polydipsia. Does not bruise/bleed easily. Past Medical History:   Diagnosis Date    Essential hypertension, benign 2015      History reviewed. No pertinent surgical history. Prior to Admission medications    Medication Sig Start Date End Date Taking? Authorizing Provider   lisinopriL (PRINIVIL, ZESTRIL) 30 mg tablet Take 1 Tab by mouth daily. 9/14/20   David Sepulveda NP   meclizine (ANTIVERT) 12.5 mg tablet Take 1 Tab by mouth three (3) times daily as needed for Dizziness. Indications: sensation of spinning or whirling 7/27/20   David Sepulveda NP     Allergies   Allergen Reactions    Onion Itching     Throat starts itching      Family medical history:  Father- Colon cancer and hypertension  Motherdiabetes mellitus    SOCIAL HISTORY:  Patient resides at home  Patient ambulates independently. Smoking history: Active smoker. 1 pack a day. Alcohol history: None. Objective:       Physical Exam:   Patient Vitals for the past 12 hrs:   BP SpO2   09/22/20 2300 (!) 166/96 98 %   09/22/20 2230 (!) 166/96 98 %   09/22/20 2215 (!) 170/90 96 %   09/22/20 2130 (!) 162/91 97 %   09/22/20 2100 (!) 169/86 96 %   09/22/20 2045 (!) 164/90 95 %     GEN APPEARANCE: Patient resting in bed in NAD  HEENT: Conjunctiva Clear  CVS: RRR, S1, S2; No M/G/R  LUNGS: CTAB; No Wheezes; No Rhonchi: No rales  ABD: Soft; No TTP; + Normoactive BS  EXT: WWP, no edema   Skin exam: No gross lesions noted on exposed skin surfaces  MENTAL STATUS: Answers questions appropriately, responds to commands.    Neuro: Cranial nerve exam: EOMI, CN V sensory/motor intact, no facial asymmetry noted, patient able to hearl, uvula midline, able to move tongue left/right. No gross motor or sensory deficits      Data Review:   Recent Results (from the past 24 hour(s))   CBC WITH AUTOMATED DIFF    Collection Time: 09/22/20  2:39 PM   Result Value Ref Range    WBC 7.7 4.1 - 11.1 K/uL    RBC 5.50 4. 10 - 5.70 M/uL    HGB 15.4 12.1 - 17.0 g/dL    HCT 45.1 36.6 - 50.3 %    MCV 82.0 80.0 - 99.0 FL    MCH 28.0 26.0 - 34.0 PG    MCHC 34.1 30.0 - 36.5 g/dL    RDW 13.4 11.5 - 14.5 %    PLATELET 571 903 - 840 K/uL    MPV 10.1 8.9 - 12.9 FL    NRBC 0.0 0  WBC    ABSOLUTE NRBC 0.00 0.00 - 0.01 K/uL    NEUTROPHILS 43 32 - 75 %    LYMPHOCYTES 45 12 - 49 %    MONOCYTES 10 5 - 13 %    EOSINOPHILS 1 0 - 7 %    BASOPHILS 1 0 - 1 %    IMMATURE GRANULOCYTES 0 0.0 - 0.5 %    ABS. NEUTROPHILS 3.3 1.8 - 8.0 K/UL    ABS. LYMPHOCYTES 3.5 0.8 - 3.5 K/UL    ABS. MONOCYTES 0.8 0.0 - 1.0 K/UL    ABS. EOSINOPHILS 0.1 0.0 - 0.4 K/UL    ABS. BASOPHILS 0.1 0.0 - 0.1 K/UL    ABS. IMM. GRANS. 0.0 0.00 - 0.04 K/UL    DF AUTOMATED     METABOLIC PANEL, COMPREHENSIVE    Collection Time: 09/22/20  2:39 PM   Result Value Ref Range    Sodium 139 136 - 145 mmol/L    Potassium 4.1 3.5 - 5.1 mmol/L    Chloride 104 97 - 108 mmol/L    CO2 25 21 - 32 mmol/L    Anion gap 10 5 - 15 mmol/L    Glucose 94 65 - 100 mg/dL    BUN 18 6 - 20 MG/DL    Creatinine 1.10 0.70 - 1.30 MG/DL    BUN/Creatinine ratio 16 12 - 20      GFR est AA >60 >60 ml/min/1.73m2    GFR est non-AA >60 >60 ml/min/1.73m2    Calcium 9.0 8.5 - 10.1 MG/DL    Bilirubin, total 0.9 0.2 - 1.0 MG/DL    ALT (SGPT) 29 12 - 78 U/L    AST (SGOT) 25 15 - 37 U/L    Alk.  phosphatase 96 45 - 117 U/L    Protein, total 7.0 6.4 - 8.2 g/dL    Albumin 3.6 3.5 - 5.0 g/dL    Globulin 3.4 2.0 - 4.0 g/dL    A-G Ratio 1.1 1.1 - 2.2     TROPONIN I    Collection Time: 09/22/20  2:39 PM   Result Value Ref Range    Troponin-I, Qt. 0.13 (H) <0.05 ng/mL   SAMPLES BEING HELD    Collection Time: 09/22/20  8:05 PM   Result Value Ref Range    SAMPLES BEING HELD 1RED, 1LAV, 1BLU     COMMENT        Add-on orders for these samples will be processed based on acceptable specimen integrity and analyte stability, which may vary by analyte. TROPONIN I    Collection Time: 09/22/20  8:05 PM   Result Value Ref Range    Troponin-I, Qt. 0.13 (H) <0.05 ng/mL       Assessment:     Active Problems:    Chest pain (9/22/2020)      Elevated troponin (9/22/2020)      60-year-old male with past medical history significant for hypertension, active tobacco user presented to the emergency room with substernal chest pain found to have elevated troponin. Plan:     1. Chest pain: Given risk factor concern for ACS. Trend cardiac enzyme  Cardiology consulted already. Possible as per documentation possible cath tomorrow. We will keep n.p.o. after midnight.  Aspirin 81 mg daily  Cardiac telemetry  On nitroglycerin as needed for chest pain. 2.  Elevated troponin: 0.13×2. No significant changes on EKG. Could be related to uncontrolled hypertension. Patient currently asymptomatic. Will hold anticoagulation for now. Active blood pressure control. Trend cardiac enzymes any worsening elevation then will anticoagulate. 3.  Hypertension: Uncontrolled hypertension. Currently on lisinopril 20 mg daily. Will recommend to increase dose to 40 mg daily. Add hydralazine 10 mg every 6 hours PRN for SBP greater than 170/90.    4.  Active tobacco user: Importance of tobacco cessation discussed with patient. Patient declined nicotine replacement therapy at this time. DVT prophylaxis: SCDs for now. CODE STATUS: Full code  Plan discussed with patient.     FUNCTIONAL STATUS PRIOR TO HOSPITALIZATION Ambulates Independently     Signed By: Adan Dias MD     September 22, 2020

## 2020-09-23 NOTE — ED NOTES
Verbal shift change report given to Melissa TEJADA (oncoming nurse) by Uriel Evans (offgoing nurse). Report included the following information SBAR, Kardex, ED Summary, [facility] and Recent Results.

## 2020-09-23 NOTE — ED NOTES
Assumed care of patient from RN. Patient resting in bed, no signs of distress, will continue to monitor.

## 2020-09-23 NOTE — ED NOTES
Spoke to Dr. Ronni Davis and informed him of pt's changes, MD still ok with pt being discharged.  Will notify attending MD>

## 2020-09-23 NOTE — ED NOTES
Received report from off going nurse, patient resting w/o complaint, bed in lowest position, call bell within reach, no distress noted.

## 2020-09-23 NOTE — ED NOTES
Spoke to Dr Steph Casarez and updated him on pt's brief episode of Afib RVR. He still agrees with initial plan to d/c patient.  Will notify MD

## 2020-09-23 NOTE — ED PROVIDER NOTES
26-year-old male history of hypertension presenting today with chest pain. He has had this for the past 3 to 4 days. Reports intermittent left-sided sharp pain without associated shortness of breath or radiation of pain. Seems to be worse with positional changes. No cough or fever. No leg pain or leg swelling. No known cardiac history other than a murmur 40 years ago. Was seen at outside facility where he had an elevated troponin. They suggested transfer for admission after discussing with Dr. Isma Russell for of cardiology however patient wanted to drive himself so he left AMA and came here. He was given aspirin prior to arrival.  No active chest pain. Past Medical History:   Diagnosis Date    Essential hypertension, benign 2015       History reviewed. No pertinent surgical history. History reviewed. No pertinent family history.     Social History     Socioeconomic History    Marital status:      Spouse name: Not on file    Number of children: Not on file    Years of education: Not on file    Highest education level: Not on file   Occupational History    Not on file   Social Needs    Financial resource strain: Not on file    Food insecurity     Worry: Not on file     Inability: Not on file    Transportation needs     Medical: Not on file     Non-medical: Not on file   Tobacco Use    Smoking status: Current Every Day Smoker    Smokeless tobacco: Never Used   Substance and Sexual Activity    Alcohol use: No    Drug use: No    Sexual activity: Yes     Partners: Female     Birth control/protection: None   Lifestyle    Physical activity     Days per week: Not on file     Minutes per session: Not on file    Stress: Not on file   Relationships    Social connections     Talks on phone: Not on file     Gets together: Not on file     Attends Shinto service: Not on file     Active member of club or organization: Not on file     Attends meetings of clubs or organizations: Not on file Relationship status: Not on file    Intimate partner violence     Fear of current or ex partner: Not on file     Emotionally abused: Not on file     Physically abused: Not on file     Forced sexual activity: Not on file   Other Topics Concern    Not on file   Social History Narrative    Not on file         ALLERGIES: Onion    Review of Systems   Constitutional: Negative for chills and fever. HENT: Negative for congestion and sore throat. Eyes: Negative for pain and redness. Respiratory: Negative for cough and shortness of breath. Cardiovascular: Positive for chest pain. Negative for palpitations. Gastrointestinal: Negative for abdominal pain, diarrhea, nausea and vomiting. Genitourinary: Negative for frequency and hematuria. Musculoskeletal: Negative for back pain and neck pain. Skin: Negative for rash and wound. Neurological: Negative for dizziness and headaches. Hematological: Does not bruise/bleed easily. Vitals:    09/22/20 2045 09/22/20 2100   BP: (!) 164/90 (!) 169/86   SpO2: 95% 96%            Physical Exam  Vitals signs and nursing note reviewed. Constitutional:       General: He is not in acute distress. Appearance: He is well-developed. HENT:      Head: Normocephalic and atraumatic. Eyes:      Conjunctiva/sclera: Conjunctivae normal.      Pupils: Pupils are equal, round, and reactive to light. Neck:      Musculoskeletal: Normal range of motion and neck supple. Cardiovascular:      Rate and Rhythm: Normal rate and regular rhythm. Heart sounds: Normal heart sounds. No murmur. No friction rub. No gallop. Pulmonary:      Effort: Pulmonary effort is normal. No respiratory distress. Breath sounds: Normal breath sounds. No wheezing or rales. Abdominal:      General: Bowel sounds are normal. There is no distension. Palpations: Abdomen is soft. Tenderness: There is no abdominal tenderness. There is no guarding or rebound.    Musculoskeletal: Normal range of motion. Skin:     General: Skin is warm and dry. Capillary Refill: Capillary refill takes less than 2 seconds. Findings: No rash. Neurological:      Mental Status: He is alert and oriented to person, place, and time. Labs Reviewed:   Repeat troponin here unchanged at 0.13      Imaging Reviewed:   Chest x-ray reviewed from outside hospital and shows no acute process      Course:    Perfect Serve Consult for Admission  9:01 PM    ED Room Number: ER17/17  Patient Name and age:  Judy Lehman 62 y.o.  male  Working Diagnosis:   1. Chest pain, unspecified type    2. Elevated troponin        COVID-19 Suspicion:  no  Sepsis present:  no  Reassessment needed: no  Code Status:  Full Code  Readmission: no  Isolation Requirements:  no  Recommended Level of Care:  telemetry  Department:[facility] Adult ED - 21   Other:  62 y.o. male here with int chest pain x 2 days. +trop. Seen at outside hospital where cards was consulted and recommended transfer for admit. No active CP. Labs otherwise ok. MDM:  36-year-old male presenting today with chest pain and elevated troponin. No active chest pain right now. No acute distress. EKG at outside facility read as nonischemic. Otherwise lab work there was also reassuring. Cardiology was consulted at outside facility and recommended transfer for admission. Upon arrival patient was admitted to hospitalist service. Clinical Impression:     ICD-10-CM ICD-9-CM    1. Chest pain, unspecified type  R07.9 786.50    2.  Elevated troponin  R79.89 790.6            Disposition: Admit  Jorge Ham DO

## 2020-09-23 NOTE — PROGRESS NOTES
Received pt on floor via wheelchair with no monitor. Pt oriented to Room, use of call light. Put on tele- SR w/ PVC's in 70's, and VS taken. BP elevated (see flowsheet)- Dr. Shayna Mai at bedside and made aware. Plan to keep pt overnight to monitor BP, proceed with echo as inpatient. Pt agreeable to plan.

## 2020-09-23 NOTE — ED NOTES
Pt's HR went from NSR into Afib RVR in 170's. MD Olaf Navarro Notified and plans for d/c cancelled. Pt converted back to NSR spontaneously. Pt remains asymptomatic, will update cardiology.

## 2020-09-23 NOTE — PROGRESS NOTES
TRANSFER - IN REPORT:    Verbal report received from Melissa (name) on Brittany Files  being received from ED (unit) for routine progression of care      Report consisted of patients Situation, Background, Assessment and   Recommendations(SBAR). Information from the following report(s) ED Summary and Cardiac Rhythm NSR was reviewed with the receiving nurse. Opportunity for questions and clarification was provided. Assessment completed upon patients arrival to unit and care assumed.

## 2020-09-23 NOTE — PROGRESS NOTES
Hospitalist Progress Note  Roque Temple MD  Answering service: [phone] from in house phone        Date of Service:  2020  NAME:  Brynn Rowley  :  1960  MRN:  [id number]      Admission Summary:   As per initial admission summary  Brynn Rowley is a 62 y.o. male with PMH Hypertension presents to the emergency room complaining of substernal chest pain that started 4 days ago. Patient states pain is intermittent and he feels like it sharp pain on the left side. It does not radiate. Not exacerbated by exertion. She has been associated with shortness of breath, dyspnea exertion, nausea, abdominal pain. No specific relieving factor. Patient came to the emergency room today because symptoms lasted for almost 3 hours which is unusual.  He went to [facility] where he was found to have an elevated troponin of 0.13. ED physician discussed case with cardiology on-call who recommended admission for further evaluation. Transfer to [facility] was recommended but patient left the ER AMA and came on his own vehicle to [facility] ED. Prior to the arrival to the emergency room [facility] he was given a dose of aspirin. At this time patient is asymptomatic. Noted to be hypertensive with a systolic blood pressure in the 170s. Patient does admit history of uncontrolled hypertension which his primary care has been managing. He was just recently recommended to increase his dose of lisinopril to 30 mg daily but he  has not been able to  new prescription. Interval history / Subjective:     Patient seen for Follow up of chest pain     Patient seen and examined by the bedside, Labs, images and notes reviewed  Patient is comfortable, denies any chest pain, SOB, abdominal pain, fevers, chills. No N/V/D  Discussed with nursing staff, no acute issues overnight, orders reviewed.   Today I discussed with cardiology. Patient was initially discharged as his stress test was negative. However he went into rapid afib with heart rate of 170. Patient was asymptomatic. Discharge was cancelled and plan was to start the patient on cardizem drip. Mean while the nursing staff told me that patient converted to sinus rhythm. His BP was 207/80. Patient was restarted on home BP meds  Added amlodipine 10 mh and als ostarted on metoprolol 12.5 mg BI     Assessment & Plan:     1. Chest pain: Given risk factor concern for ACS. Resolved   Trend cardiac enzyme  Cardiology consulted   Discharge cancelled. stress test negative    Aspirin 81 mg daily  Cardiac telemetry  On nitroglycerin as needed for chest pain. # afib with RVR,   HR in 170  patient does not have any known diagnosis of afib before  Plan was to start the patient on nicardipine drip but after some time patient reverted back to sinus rhythm  Started on metoprolol 12.5 mg  Advised the nursing staff that we will cancel the discharge. will keep the patient in the hospital  Echo ordered  Advised the nursing staff to page cardiology as well.        2.  Elevated troponin: 0.13×2. No significant changes on EKG. Could be related to uncontrolled hypertension. Patient currently asymptomatic. Stress test negative      3. Hypertension: lisinopril increased to 40 mg  Amlodipine added 10 mg  Also metoprolol 12.5 mg BID     4. Active tobacco user:  Patient declined nicotine replacement therapy at this time.       Code status: Full code   DVT prophylaxis: heparin SC    Care Plan discussed with: Patient/Family  Disposition: Home w/Family and TBD     Hospital Problems  Date Reviewed: 9/14/2020          Codes Class Noted POA    Chest pain ICD-10-CM: R07.9  ICD-9-CM: 786.50  9/22/2020 Unknown        Elevated troponin ICD-10-CM: R79.89  ICD-9-CM: 790.6  9/22/2020 Unknown                Review of Systems:   A comprehensive review of systems was negative except for that written in the HPI. Vital Signs:    Last 24hrs VS reviewed since prior progress note. Most recent are:  Visit Vitals  BP (!) 207/80   Pulse (!) 160   Temp 98.3 °F (36.8 °C)   Resp 16   Ht 5' 7\" (1.702 m)   Wt 78.5 kg (173 lb)   SpO2 95%   BMI 27.10 kg/m²       No intake or output data in the 24 hours ending 09/23/20 1429     Physical Examination:             Constitutional:  No acute distress, cooperative, pleasant    ENT:  Oral mucous moist, oropharynx benign. Neck supple,    Resp:  CTA bilaterally. No wheezing/rhonchi/rales. No accessory muscle use   CV:  Regular rhythm, normal rate, no murmurs, gallops, rubs    GI:  Soft, non distended, non tender. normoactive bowel sounds, no hepatosplenomegaly     Musculoskeletal:  No edema, warm, 2+ pulses throughout    Neurologic:  Moves all extremities. AAOx3, CN II-XII reviewed           Data Review:    Review and/or order of clinical lab test  Review and/or order of tests in the radiology section of CPT  Review and/or order of tests in the medicine section of CPT      Labs:     Recent Labs     09/22/20  1439   WBC 7.7   HGB 15.4   HCT 45.1        Recent Labs     09/22/20  1439      K 4.1      CO2 25   BUN 18   CREA 1.10   GLU 94   CA 9.0     Recent Labs     09/22/20  1439   ALT 29   AP 96   TBILI 0.9   TP 7.0   ALB 3.6   GLOB 3.4     No results for input(s): INR, PTP, APTT, INREXT in the last 72 hours. No results for input(s): FE, TIBC, PSAT, FERR in the last 72 hours. No results found for: FOL, RBCF   No results for input(s): PH, PCO2, PO2 in the last 72 hours.   Recent Labs     09/23/20  0202 09/22/20 2005 09/22/20  1439   TROIQ 0.12* 0.13* 0.13*     Lab Results   Component Value Date/Time    Cholesterol, total 140 08/25/2020 11:44 AM    HDL Cholesterol 36 (L) 08/25/2020 11:44 AM    LDL, calculated 84 08/25/2020 11:44 AM    Triglyceride 99 08/25/2020 11:44 AM     Lab Results   Component Value Date/Time    Glucose (POC) 95 09/23/2020 08:11 AM     No results found for: COLOR, APPRN, SPGRU, REFSG, LEANNA, PROTU, GLUCU, KETU, BILU, UROU, KYLE, LEUKU, GLUKE, EPSU, BACTU, WBCU, RBCU, CASTS, UCRY      Medications Reviewed:     Current Facility-Administered Medications   Medication Dose Route Frequency    aspirin chewable tablet 81 mg  81 mg Oral DAILY    amLODIPine (NORVASC) tablet 10 mg  10 mg Oral DAILY    lisinopriL (PRINIVIL, ZESTRIL) tablet 40 mg  40 mg Oral DAILY    sodium chloride (NS) flush 5-40 mL  5-40 mL IntraVENous Q8H    sodium chloride (NS) flush 5-40 mL  5-40 mL IntraVENous PRN    acetaminophen (TYLENOL) tablet 650 mg  650 mg Oral Q6H PRN    Or    acetaminophen (TYLENOL) suppository 650 mg  650 mg Rectal Q6H PRN    polyethylene glycol (MIRALAX) packet 17 g  17 g Oral DAILY PRN    promethazine (PHENERGAN) tablet 12.5 mg  12.5 mg Oral Q6H PRN    Or    ondansetron (ZOFRAN) injection 4 mg  4 mg IntraVENous Q6H PRN    hydrALAZINE (APRESOLINE) 20 mg/mL injection 10 mg  10 mg IntraVENous Q6H PRN     Current Outpatient Medications   Medication Sig    lisinopriL (PRINIVIL, ZESTRIL) 40 mg tablet Take 1 Tab by mouth daily.  aspirin 81 mg chewable tablet Take 1 Tab by mouth daily.  meclizine (ANTIVERT) 12.5 mg tablet Take 1 Tab by mouth three (3) times daily as needed for Dizziness.  Indications: sensation of spinning or whirling     ______________________________________________________________________  EXPECTED LENGTH OF STAY: - - -  ACTUAL LENGTH OF STAY:          1                 Chris Cuello MD

## 2020-09-23 NOTE — ED NOTES
Verbal shift change report given to [facility] [address] (oncoming nurse) by Hu Avendaño (offgoing nurse). Report included the following information SBAR, Kardex, ED Summary, [facility] and Recent Results.

## 2020-09-23 NOTE — DISCHARGE SUMMARY
Inpatient hospitalist discharge summary Brief Overview PATIENT ID: Lillian Garcia MRN: [id number] YOB: 1960 Admitting Provider: Akin Willis MD 
 
Discharging Provider: Gera Martinez MD  
To contact this individual call [phone] and ask the  to page. If unavailable ask to be transferred the Adult Hospitalist Department. PCP at discharge: Meghan Hinojosa, [address]  
[address] [facility] [address] Admission date: 9/22/2020  Date of Discharge: 09/23/20 Chief complaint:  
Chief Complaint Patient presents with  Chest Pain Patient Active Problem List  
Diagnosis Code  Contact dermatitis L25.9  Essential hypertension, benign I10  
 Noncompliance Z91.19  
 Ischial bursitis of right side M70.71  
 TMJ (temporomandibular joint disorder) M26.609  Overweight E66.3  Chest pain R07.9  Elevated troponin R79.89 Discharge diagnosis, hospital course/plan: As per initial admission summary Lillian Garcia is a 62 y.o. male with PMH Hypertension presents to the emergency room complaining of substernal chest pain that started 4 days ago. Patient states pain is intermittent and he feels like it sharp pain on the left side. It does not radiate. Not exacerbated by exertion. She has been associated with shortness of breath, dyspnea exertion, nausea, abdominal pain. No specific relieving factor. Patient came to the emergency room today because symptoms lasted for almost 3 hours which is unusual.  He went to [facility] where he was found to have an elevated troponin of 0.13. ED physician discussed case with cardiology on-call who recommended admission for further evaluation. Transfer to [facility] was recommended but patient left the ER AMA and came on his own vehicle to [facility]. Prior to the arrival to the emergency room [facility] he was given a dose of aspirin.   At this time patient is asymptomatic. Noted to be hypertensive with a systolic blood pressure in the 170s. Patient does admit history of uncontrolled hypertension which his primary care has been managing. He was just recently recommended to increase his dose of lisinopril to 30 mg daily but he  has not been able to  new prescription. 1.  Chest pain: resolved #eelvated troponin stress test negative. cardiology ok discharging the patient Need to folllow up with cardiology as outpatient  
  
  
3. Hypertension:  
Will continue lisinopril 40 mg on discharge  
  
4. Active tobacco user: 
Patient declined nicotine replacement therapy at this time. 
  
 
On the date of discharge, diagnostic face to face encounter was performed. Patient was hemodynamically stable, offering no new complaints. Denies any shortness of breath at rest, no fevers or chills, no diarrhea or constipation. Patient is agreeable for discharge. Patient understood and verbalized the understanding of the discharge plan. Patient was advised to seek medical help/ care or return to ED, if symptoms recur, worsen or new symptoms develop. Discharge Disposition: 
Home or Self Care Discharge activity: 
Activity as tolerated Code status at discharge: 
Full Code Outpatient follow up: 
Please follow up with your PCP in one week In addition follow up with cardiology as scheduled Future appointments- No future appointments. Follow-up Information Follow up With Specialties Details Why Contact Navya Lopez July, NP Nurse Practitioner   [address] [facility] [address] [phone] Operative procedures performed: 
 
 
Consults: IP CONSULT TO CARDIOLOGY Procedures: * No surgery found * Diet:  DIET ONE TIME MESSAGE Pertinent test results: 
Xr Chest Sngl V Result Date: 9/22/2020 Chest single view dated 9/20/2020.  History is chest pain and essential hypertension A single portable AP upright view of the chest was obtained. It is difficult to accurately assess the size of the cardiac silhouette. There is no evidence of active lung disease. IMPRESSION: No evidence of active lung disease. Recent Results (from the past 168 hour(s)) CBC WITH AUTOMATED DIFF Collection Time: 09/22/20  2:39 PM  
Result Value Ref Range WBC 7.7 4.1 - 11.1 K/uL  
 RBC 5.50 4. 10 - 5.70 M/uL  
 HGB 15.4 12.1 - 17.0 g/dL HCT 45.1 36.6 - 50.3 % MCV 82.0 80.0 - 99.0 FL  
 MCH 28.0 26.0 - 34.0 PG  
 MCHC 34.1 30.0 - 36.5 g/dL  
 RDW 13.4 11.5 - 14.5 % PLATELET 351 011 - 171 K/uL MPV 10.1 8.9 - 12.9 FL  
 NRBC 0.0 0  WBC ABSOLUTE NRBC 0.00 0.00 - 0.01 K/uL NEUTROPHILS 43 32 - 75 % LYMPHOCYTES 45 12 - 49 % MONOCYTES 10 5 - 13 % EOSINOPHILS 1 0 - 7 % BASOPHILS 1 0 - 1 % IMMATURE GRANULOCYTES 0 0.0 - 0.5 % ABS. NEUTROPHILS 3.3 1.8 - 8.0 K/UL  
 ABS. LYMPHOCYTES 3.5 0.8 - 3.5 K/UL  
 ABS. MONOCYTES 0.8 0.0 - 1.0 K/UL  
 ABS. EOSINOPHILS 0.1 0.0 - 0.4 K/UL  
 ABS. BASOPHILS 0.1 0.0 - 0.1 K/UL  
 ABS. IMM. GRANS. 0.0 0.00 - 0.04 K/UL  
 DF AUTOMATED METABOLIC PANEL, COMPREHENSIVE Collection Time: 09/22/20  2:39 PM  
Result Value Ref Range Sodium 139 136 - 145 mmol/L Potassium 4.1 3.5 - 5.1 mmol/L Chloride 104 97 - 108 mmol/L  
 CO2 25 21 - 32 mmol/L Anion gap 10 5 - 15 mmol/L Glucose 94 65 - 100 mg/dL BUN 18 6 - 20 MG/DL Creatinine 1.10 0.70 - 1.30 MG/DL  
 BUN/Creatinine ratio 16 12 - 20 GFR est AA >60 >60 ml/min/1.73m2 GFR est non-AA >60 >60 ml/min/1.73m2 Calcium 9.0 8.5 - 10.1 MG/DL Bilirubin, total 0.9 0.2 - 1.0 MG/DL  
 ALT (SGPT) 29 12 - 78 U/L  
 AST (SGOT) 25 15 - 37 U/L Alk. phosphatase 96 45 - 117 U/L Protein, total 7.0 6.4 - 8.2 g/dL Albumin 3.6 3.5 - 5.0 g/dL Globulin 3.4 2.0 - 4.0 g/dL A-G Ratio 1.1 1.1 - 2.2    
TROPONIN I  Collection Time: 09/22/20  2:39 PM  
 Result Value Ref Range Troponin-I, Qt. 0.13 (H) <0.05 ng/mL EKG, 12 LEAD, INITIAL Collection Time: 09/22/20  7:56 PM  
Result Value Ref Range Ventricular Rate 67 BPM  
 Atrial Rate 67 BPM  
 P-R Interval 110 ms QRS Duration 70 ms Q-T Interval 398 ms QTC Calculation (Bezet) 420 ms Calculated P Axis 44 degrees Calculated R Axis 42 degrees Calculated T Axis -28 degrees Diagnosis Sinus rhythm with short DC Nonspecific T wave abnormality When compared with ECG of 22-SEP-2020 14:32, No significant change was found Confirmed by Surekha Champagne M.D., [address] [facility] ([id number]) on 9/23/2020 6:47:43 AM 
  
SAMPLES BEING HELD Collection Time: 09/22/20  8:05 PM  
Result Value Ref Range SAMPLES BEING HELD 1RED, 1LAV, 1BLU   
 COMMENT Add-on orders for these samples will be processed based on acceptable specimen integrity and analyte stability, which may vary by analyte. TROPONIN I Collection Time: 09/22/20  8:05 PM  
Result Value Ref Range Troponin-I, Qt. 0.13 (H) <0.05 ng/mL TROPONIN I Collection Time: 09/23/20  2:02 AM  
Result Value Ref Range Troponin-I, Qt. 0.12 (H) <0.05 ng/mL GLUCOSE, POC Collection Time: 09/23/20  8:11 AM  
Result Value Ref Range Glucose (POC) 95 65 - 100 mg/dL Performed by Carol Cuello NUCLEAR CARDIAC STRESS TEST Collection Time: 09/23/20 11:31 AM  
Result Value Ref Range Target  bpm  
 Exercise duration time 00:03:00 Stress Base Systolic  mmHg Stress Base Diastolic  mmHg Post peak  BPM  
 Baseline HR 63 BPM  
 Estimated workload 1.0 METS Percent HR 65 % Stress Rate Pressure Product 22,048 BPM*mmHg Physical Exam on Discharge: 
 
Discharge condition: good Vital signs:  
Patient Vitals for the past 12 hrs: 
 Temp Pulse Resp BP SpO2  
09/23/20 0959    (!) 167/99   
09/23/20 0900 98.3 °F (36.8 °C) 64 19 (!) 167/93 98 %  
09/23/20 0741 98.2 °F (36.8 °C) 66 16 (!) 162/92 97 % 09/23/20 0350 98.6 °F (37 °C) 69 18 130/67 98 % Visit Vitals BP (!) 167/99 Pulse 64 Temp 98.3 °F (36.8 °C) Resp 19 Ht 5' 7\" (1.702 m) Wt 78.5 kg (173 lb) SpO2 98% BMI 27.10 kg/m² General:  Alert, cooperative, no distress, appears stated age. Head:  Normocephalic, without obvious abnormality, atraumatic. Lungs:   Clear to auscultation bilaterally. Chest wall:  No tenderness or deformity. Heart:  Regular rate and rhythm, S1, S2 normal, no murmur, click, rub or gallop. Abdomen:   Soft, non-tender. Bowel sounds normal. No masses,  No organomegaly. Current Discharge Medication List  
  
START taking these medications Details  
aspirin 81 mg chewable tablet Take 1 Tab by mouth daily. Qty: 30 Tab, Refills: 0 CONTINUE these medications which have CHANGED Details  
lisinopriL (PRINIVIL, ZESTRIL) 40 mg tablet Take 1 Tab by mouth daily. Qty: 30 Tab, Refills: 0 CONTINUE these medications which have NOT CHANGED Details  
meclizine (ANTIVERT) 12.5 mg tablet Take 1 Tab by mouth three (3) times daily as needed for Dizziness. Indications: sensation of spinning or whirling 
Qty: 30 Tab, Refills: 1 Associated Diagnoses: Vertigo Total time spent on discharge planning, counseling and co-ordination of care:  
35 minutes Austin Sarabia MD 
09/23/20 
1:40 PM

## 2020-09-24 ENCOUNTER — APPOINTMENT (OUTPATIENT)
Dept: NON INVASIVE DIAGNOSTICS | Age: 60
DRG: 313 | End: 2020-09-24
Attending: INTERNAL MEDICINE
Payer: OTHER GOVERNMENT

## 2020-09-24 VITALS
RESPIRATION RATE: 16 BRPM | BODY MASS INDEX: 25.78 KG/M2 | HEIGHT: 67 IN | DIASTOLIC BLOOD PRESSURE: 87 MMHG | TEMPERATURE: 97.7 F | SYSTOLIC BLOOD PRESSURE: 168 MMHG | HEART RATE: 65 BPM | WEIGHT: 164.24 LBS | OXYGEN SATURATION: 98 %

## 2020-09-24 LAB
ATRIAL RATE: 150 BPM
CALCULATED R AXIS, ECG10: 30 DEGREES
CALCULATED T AXIS, ECG11: -3 DEGREES
DIAGNOSIS, 93000: NORMAL
ECHO AV AREA PEAK VELOCITY: 3 CM2
ECHO AV AREA/BSA PEAK VELOCITY: 1.6 CM2/M2
ECHO AV PEAK GRADIENT: 7.75 MMHG
ECHO AV PEAK VELOCITY: 139.15 CM/S
ECHO LA AREA 4C: 20.11 CM2
ECHO LA MAJOR AXIS: 3.97 CM
ECHO LA MINOR AXIS: 2.13 CM
ECHO LA TO AORTIC ROOT RATIO: 1.19
ECHO LA VOL 4C: 62.49 ML (ref 18–58)
ECHO LA VOLUME INDEX A4C: 33.6 ML/M2 (ref 16–28)
ECHO LV E' LATERAL VELOCITY: 9.37 CM/S
ECHO LV E' SEPTAL VELOCITY: 9.21 CM/S
ECHO LV INTERNAL DIMENSION DIASTOLIC: 4.01 CM (ref 4.2–5.9)
ECHO LV INTERNAL DIMENSION SYSTOLIC: 2.81 CM
ECHO LV IVSD: 0.86 CM (ref 0.6–1)
ECHO LV MASS 2D: 133.9 G (ref 88–224)
ECHO LV MASS INDEX 2D: 72 G/M2 (ref 49–115)
ECHO LV POSTERIOR WALL DIASTOLIC: 1.21 CM (ref 0.6–1)
ECHO LVOT DIAM: 2.2 CM
ECHO LVOT PEAK GRADIENT: 4.8 MMHG
ECHO LVOT PEAK VELOCITY: 109.52 CM/S
ECHO MV A VELOCITY: 62.3 CM/S
ECHO MV E VELOCITY: 80.24 CM/S
ECHO MV E/A RATIO: 1.29
ECHO MV E/E' LATERAL: 8.56
ECHO MV E/E' RATIO (AVERAGED): 8.64
ECHO MV E/E' SEPTAL: 8.71
ECHO RV INTERNAL DIMENSION: 3.73 CM
ECHO RV TAPSE: 3.07 CM (ref 1.5–2)
ECHO TV REGURGITANT MAX VELOCITY: 284.77 CM/S
ECHO TV REGURGITANT PEAK GRADIENT: 32.44 MMHG
ECHO TV REGURGITANT PEAK GRADIENT: 32.44 MMHG
Q-T INTERVAL, ECG07: 288 MS
QRS DURATION, ECG06: 82 MS
QTC CALCULATION (BEZET), ECG08: 457 MS
STRESS BASELINE HR: 63 BPM
STRESS ESTIMATED WORKLOAD: 1 METS
STRESS EXERCISE DUR MIN: NORMAL
STRESS PEAK DIAS BP: 109 MMHG
STRESS PEAK SYS BP: 212 MMHG
STRESS PERCENT HR ACHIEVED: 65 %
STRESS POST PEAK HR: 104 BPM
STRESS RATE PRESSURE PRODUCT: NORMAL BPM*MMHG
STRESS TARGET HR: 161 BPM
VENTRICULAR RATE, ECG03: 152 BPM

## 2020-09-24 PROCEDURE — 93306 TTE W/DOPPLER COMPLETE: CPT

## 2020-09-24 PROCEDURE — 74011250637 HC RX REV CODE- 250/637: Performed by: INTERNAL MEDICINE

## 2020-09-24 RX ORDER — METOPROLOL TARTRATE 50 MG/1
50 TABLET ORAL EVERY 12 HOURS
Qty: 60 TAB | Refills: 1 | Status: SHIPPED | OUTPATIENT
Start: 2020-09-24 | End: 2020-09-24 | Stop reason: SDUPTHER

## 2020-09-24 RX ORDER — LISINOPRIL 40 MG/1
40 TABLET ORAL DAILY
Qty: 30 TAB | Refills: 1 | Status: SHIPPED | OUTPATIENT
Start: 2020-09-24

## 2020-09-24 RX ORDER — METOPROLOL TARTRATE 50 MG/1
50 TABLET ORAL EVERY 12 HOURS
Qty: 60 TAB | Refills: 1 | Status: SHIPPED | OUTPATIENT
Start: 2020-09-24

## 2020-09-24 RX ORDER — METOPROLOL TARTRATE 50 MG/1
50 TABLET ORAL EVERY 12 HOURS
Status: DISCONTINUED | OUTPATIENT
Start: 2020-09-24 | End: 2020-09-24 | Stop reason: HOSPADM

## 2020-09-24 RX ORDER — METOPROLOL TARTRATE 25 MG/1
25 TABLET, FILM COATED ORAL EVERY 12 HOURS
Status: DISCONTINUED | OUTPATIENT
Start: 2020-09-24 | End: 2020-09-24

## 2020-09-24 RX ADMIN — ASPIRIN 81 MG CHEWABLE TABLET 81 MG: 81 TABLET CHEWABLE at 09:26

## 2020-09-24 RX ADMIN — Medication 10 ML: at 06:07

## 2020-09-24 RX ADMIN — AMLODIPINE BESYLATE 10 MG: 5 TABLET ORAL at 09:26

## 2020-09-24 RX ADMIN — METOPROLOL TARTRATE 12.5 MG: 25 TABLET, FILM COATED ORAL at 09:26

## 2020-09-24 RX ADMIN — LISINOPRIL 40 MG: 20 TABLET ORAL at 09:26

## 2020-09-24 NOTE — PROGRESS NOTES
Problem: Falls - Risk of  Goal: *Absence of Falls  Description: Document Donata Carrel Fall Risk and appropriate interventions in the flowsheet.   Outcome: Progressing Towards Goal  Note: Fall Risk Interventions:            Medication Interventions: Teach patient to arise slowly                   Problem: Unstable angina/NSTEMI: Day 2  Goal: Activity/Safety  Outcome: Progressing Towards Goal  Goal: Consults, if ordered  Outcome: Progressing Towards Goal  Goal: Diagnostic Test/Procedures  Outcome: Progressing Towards Goal  Goal: Nutrition/Diet  Outcome: Progressing Towards Goal  Goal: Discharge Planning  Outcome: Progressing Towards Goal  Goal: Medications  Outcome: Progressing Towards Goal  Goal: Respiratory  Outcome: Progressing Towards Goal  Goal: Treatments/Interventions/Procedures  Outcome: Progressing Towards Goal  Goal: Psychosocial  Outcome: Progressing Towards Goal  Goal: *Hemodynamically stable  Outcome: Progressing Towards Goal  Goal: *Optimal pain control at patient's stated goal  Outcome: Progressing Towards Goal  Goal: *Lungs clear or at baseline  Outcome: Progressing Towards Goal     Problem: Hypertension  Goal: *Blood pressure within specified parameters  Outcome: Not Progressing Towards Goal  Goal: *Fluid volume balance  Outcome: Progressing Towards Goal  Goal: *Labs within defined limits  Outcome: Progressing Towards Goal

## 2020-09-24 NOTE — DISCHARGE SUMMARY
Discharge Summary       PATIENT ID: Laure Youngblood  MRN: [id number]   YOB: 1960    DATE OF ADMISSION: 9/22/2020  8:26 PM    DATE OF DISCHARGE: 9/24/2020    PRIMARY CARE PROVIDER: Kate Cage NP     ATTENDING PHYSICIAN: Darwin Wilson MD   DISCHARGING PROVIDER: Kia Winters MD    To contact this individual call [phone] and ask the  to page. If unavailable ask to be transferred the Adult Hospitalist Department. CONSULTATIONS: IP CONSULT TO CARDIOLOGY    PROCEDURES/SURGERIES: * No surgery found *    ADMITTING DIAGNOSES & HOSPITAL COURSE:   Admission Summary:   As per initial admission summary  Nisha Tim a 62 y. o. male with PMH Hypertension presents to the emergency room complaining of substernal chest pain that started 4 days ago.  Patient states pain is intermittent and he feels like it sharp pain on the left side.  It does not radiate.  Not exacerbated by exertion.  She has been associated with shortness of breath, dyspnea exertion, nausea, abdominal pain.  No specific relieving factor.  Patient came to the emergency room today because symptoms lasted for almost 3 hours which is unusual.  He went to [facility] where he was found to have an elevated troponin of 0.13.  ED physician discussed case with cardiology on-call who recommended admission for further evaluation.  Transfer to [facility] was recommended but patient left the ER AMA and came on his own vehicle to [facility]. Katie Crigler to the arrival to the emergency room [facility] he was given a dose of aspirin.  At this time patient is asymptomatic.  Noted to be hypertensive with a systolic blood pressure in the 170s.  Patient does admit history of uncontrolled hypertension which his primary care has been managing. Raul Andradeterry was just recently recommended to increase his dose of lisinopril to 30 mg daily but he  has not been able to  new prescription.               Assessment & Plan:      1.  Chest pain: Given risk factor concern for ACS. Resolved   Trend cardiac enzyme  Cardiology consulted    stress test negative    Aspirin 81 mg daily           Paroxysmal afib with RVR,   HR in 170  patient does not have any known diagnosis of afib before  Plan was to start the patient on nicardipine drip but after some time patient reverted back to sinus rhythm  Started on metoprolol 12.5 mg 9/23. Increase metoprolol to 50mg bid. Echo done, result pending. Will call pt if any abnormal          Elevated troponin: 0.13×2.  No significant changes on EKG.  Could be related to uncontrolled hypertension.  Patient currently asymptomatic.    Stress test negative      Hypertension: lisinopril increased to 40 mg  Increased metoprolol to 50mg bid  Continue follow PCP for further bp management      Active tobacco user:  Patient declined nicotine replacement therapy at this time.                     DISCHARGE PLAN:      1. Follow up pcp in 1 week for BP management   2. Follow up cardiologist in 2 weeks      ADDITIONAL CARE RECOMMENDATIONS:   None     PENDING TEST RESULTS:   At the time of discharge the following test results are still pending: none     FOLLOW UP APPOINTMENTS:    Follow-up Information     Follow up With Specialties Details Why Contact Info    Roselia Cordero NP Nurse Practitioner In 1 week  [facility]  [phone]      Kam Parada MD Cardiology In 2 weeks  [address]  [phone]               DIET: Cardiac Diet      ACTIVITY: Activity as tolerated          DISCHARGE MEDICATIONS:  Discharge Medication List as of 9/24/2020  2:01 PM      START taking these medications    Details   metoprolol tartrate (LOPRESSOR) 50 mg tablet Take 1 Tab by mouth every twelve (12) hours. , Print, Disp-60 Tab,R-1         CONTINUE these medications which have CHANGED    Details   aspirin 81 mg chewable tablet Take 1 Tab by mouth daily. , Print, Disp-30 Tab,R-0      lisinopriL (PRINIVIL, ZESTRIL) 40 mg tablet Take 1 Tab by mouth daily. , Print, Disp-30 Tab,R-0         CONTINUE these medications which have NOT CHANGED    Details   omeprazole (PriLOSEC OTC) 20 mg tablet Take 20 mg by mouth daily. , Historical Med      meclizine (ANTIVERT) 12.5 mg tablet Take 1 Tab by mouth three (3) times daily as needed for Dizziness. Indications: sensation of spinning or whirling, Normal, Disp-30 Tab,R-1               NOTIFY YOUR PHYSICIAN FOR ANY OF THE FOLLOWING:   Fever over 101 degrees for 24 hours. Chest pain, shortness of breath, fever, chills, nausea, vomiting, diarrhea, change in mentation, falling, weakness, bleeding. Severe pain or pain not relieved by medications. Or, any other signs or symptoms that you may have questions about. DISPOSITION:    Home With:   OT  PT  HH  RN       Long term SNF/Inpatient Rehab    Independent/assisted living    Hospice   x Other:       PATIENT CONDITION AT DISCHARGE:     Functional status    Poor     Deconditioned    x Independent      Cognition     Lucid     Forgetful     Dementia      Catheters/lines (plus indication)    Potts     PICC     PEG    x None      Code status   x  Full code     DNR      PHYSICAL EXAMINATION AT DISCHARGE:  General:          Alert, cooperative, no distress, appears stated age. HEENT:           Atraumatic, anicteric sclerae, pink conjunctivae                          No oral ulcers, mucosa moist, throat clear, dentition fair  Neck:               Supple, symmetrical  Lungs:             Clear to auscultation bilaterally. No Wheezing or Rhonchi. No rales. Chest wall:      No tenderness  No Accessory muscle use. Heart:              Regular  rhythm,  No  murmur   No edema  Abdomen:        Soft, non-tender. Not distended. Bowel sounds normal  Extremities:     No cyanosis. No clubbing,                            Skin turgor normal, Capillary refill normal  Skin:                Not pale.   Not Jaundiced  No rashes   Psych: Not anxious or agitated.   Neurologic:      Alert, moves all extremities, answers questions appropriately and responds to commands       CHRONIC MEDICAL DIAGNOSES:  Problem List as of 9/24/2020 Date Reviewed: 9/14/2020          Codes Class Noted - Resolved    Chest pain ICD-10-CM: R07.9  ICD-9-CM: 786.50  9/22/2020 - Present        Elevated troponin ICD-10-CM: R79.89  ICD-9-CM: 790.6  9/22/2020 - Present        Overweight ICD-10-CM: E66.3  ICD-9-CM: 278.02  7/27/2020 - Present        TMJ (temporomandibular joint disorder) ICD-10-CM: M26.609  ICD-9-CM: 524.60  11/26/2019 - Present        Ischial bursitis of right side ICD-10-CM: M70.71  ICD-9-CM: 726.5  7/2/2018 - Present        Noncompliance ICD-10-CM: Z91.19  ICD-9-CM: V15.81  2/1/2018 - Present        Essential hypertension, benign ICD-10-CM: I10  ICD-9-CM: 401.1  Unknown - Present        Contact dermatitis ICD-10-CM: L25.9  ICD-9-CM: 692.9  9/22/2013 - Present              Greater than 30  minutes were spent with the patient on counseling and coordination of care    Signed:   Danielle Leblanc MD  9/24/2020  4:34 PM

## 2020-09-24 NOTE — PROGRESS NOTES
TRANSITIONS OF CARE PLAN:   1. DESTINATION: Own Home  2. TRANSPORT: Spouse present at bedside  3. ADDITIONAL SUPPORT: spouse  4. DME: bp cuff  5. HOME HEALTH: not likely  6. CODE STATUS/AMD STATUS: full code; not on file  7. FOLLOW UP APPOINTMENTS: PCP, Cardio  8. STILL NEEDS: Monitoring of A-Fib, Echo    Reason for Admission:   Chest Pain, Elevated Troponin                   RUR Score:          9%           Plan for utilizing home health:      Not Likely    PCP: First and Last name:  NP Melany Muñoz   [facility] of [facility]   Are you a current patient: Yes/No: Yes   Approximate date of last visit: last week   Can you participate in a virtual visit with your PCP: yes                    Current Advanced Directive/Advance Care Plan: FULL CODE: not on file                         Transition of Care Plan:               CM met with patient, with patient alert and oriented x 4. Patient has no hx of HH, IPR, or SNF. Pharmacy preference is [facility] in [place]. Patient identified that he has VA Coverage and also a BCBS plan, but the [place] will be covering this admission. Patient identified that he has been in communication with the [place] re: his chest pain on Monday ([phone] ) who authorized patient to proceed to [address] [place] [address]. CM contacted [address] [place] [address]: [phone]) who requested to have the Refusal Form faxed to confirmed fax F:   [phone]. CM faxed form with clinicals; original on hard chart. Disposition is for discharge to own home with transport via spouse. Care Management Interventions  PCP Verified by CM: Yes(last seen by EDEL Fraser last week)  Palliative Care Criteria Met (RRAT>21 & CHF Dx)?: No  Mode of Transport at Discharge:  Other (see comment)(spouse present at bedside)  Transition of Care Consult (CM Consult): Discharge Planning  MyChart Signup: Yes  Discharge Durable Medical Equipment: No(has bp cuff)  Health Maintenance Reviewed: Yes(CM met with patient, with patient alert and oriented x 4)  Physical Therapy Consult: No  Occupational Therapy Consult: No  Speech Therapy Consult: No  Current Support Network: Own Home, Lives with Spouse  Confirm Follow Up Transport: Self(independent in adls, to include driving)  [employer] Provided?: No  Discharge Location  Discharge Placement: Home with family assistance(lives with spouse in a 2 story home, 5 exterior steps, first floor bedroom, 16 interior steps)  CRM: Erasmo Chapa MPH, [address] [place] [address]; Z: [phone]

## 2020-09-24 NOTE — CARDIO/PULMONARY
Cardiac Rehab: Per EMR, patient is a current smoker.  Smoking Cessation Program information placed on the AVS.   
Em Jacobsen RN

## 2020-09-24 NOTE — PROGRESS NOTES
Stress test with no ischemia yesterday    Had brief episode of a.fib  Remains in NSR now    [facility] for discharge today from cardiac standpoint   F/u with me in 2 weeks as OP

## 2020-09-24 NOTE — PROGRESS NOTES
I have reviewed discharge instructions with the patient and spouse. The patient and spouse verbalized understanding. Discharge medications reviewed with patient and spouse and appropriate educational materials and side effects teaching were provided. If you experience chest pain call 911. Do   not drive yourself.

## 2020-09-24 NOTE — DISCHARGE INSTRUCTIONS
Discharge Instructions       PATIENT ID: Michael Ya  MRN: [id number]   YOB: 1960    DATE OF ADMISSION: 9/22/2020  8:26 PM    DATE OF DISCHARGE: 9/24/2020    PRIMARY CARE PROVIDER: Jessee Cogan, NP     ATTENDING PHYSICIAN: Deidra Stahl MD  DISCHARGING PROVIDER: Horace Zamora MD    To contact this individual call [phone] and ask the  to page. If unavailable ask to be transferred the Adult Hospitalist Department. DISCHARGE DIAGNOSES elevated troponin    CONSULTATIONS: IP CONSULT TO CARDIOLOGY    PROCEDURES/SURGERIES: * No surgery found *      FOLLOW UP APPOINTMENTS:   Follow-up Information     Follow up With Specialties Details Why Contact Info    Jessee Cogan, NP Nurse Practitioner In 1 week  [address] [facility]  [phone]      Baldomero Rossi MD Cardiology In 2 weeks  [address]  [phone]             ADDITIONAL CARE RECOMMENDATIONS:   Follow up with pcp in a week for your blood pressure   Follow up with cardiology  In 2 weeks     DIET: Cardiac Diet    ACTIVITY: Activity as tolerated        DISCHARGE MEDICATIONS:   See Medication Reconciliation Form    · It is important that you take the medication exactly as they are prescribed. · Keep your medication in the bottles provided by the pharmacist and keep a list of the medication names, dosages, and times to be taken in your wallet. · Do not take other medications without consulting your doctor. NOTIFY YOUR PHYSICIAN FOR ANY OF THE FOLLOWING:   Fever over 101 degrees for 24 hours. Chest pain, shortness of breath, fever, chills, nausea, vomiting, diarrhea, change in mentation, falling, weakness, bleeding. Severe pain or pain not relieved by medications. Or, any other signs or symptoms that you may have questions about.       DISPOSITION:    Home With:   OT  PT  HH  RN       SNF/Inpatient Rehab/LTAC    Independent/assisted living    Hospice   x Other:     CDMP Checked: Yes x     PROBLEM LIST Updated:  Yes ***       Information obtained by :   I understand that if any problems occur once I am at home I am to contact my physician. I understand and acknowledge receipt of the instructions indicated above. [de-identified] or R.N.'s Signature                                                                  Date/Time                                                                                                                                              Patient or Representative Signature                                                          Date/Time          Signed:   Rd Rebollar MD  9/24/2020  2:01 PM             Smoking Cessation Program: This is a free, phone/text/email based, smoking cessation program. The program is individualized to meet each patient's needs. To enroll use the link - [facility]. [email or web address]/quit or text Jami Oviedo to [phone] from any smart phone.

## 2020-09-24 NOTE — PROGRESS NOTES
Bedside shift change report given to Onur Gallardo RN (oncoming nurse) by Flori Inman RN (offgoing nurse). Report included the following information SBAR, Kardex, Recent Results and Cardiac Rhythm NSR.

## 2021-09-15 ENCOUNTER — VIRTUAL VISIT (OUTPATIENT)
Dept: FAMILY MEDICINE CLINIC | Age: 61
End: 2021-09-15
Payer: COMMERCIAL

## 2021-09-15 DIAGNOSIS — J34.89 STUFFY AND RUNNY NOSE: Primary | ICD-10-CM

## 2021-09-15 DIAGNOSIS — R68.83 CHILLS: ICD-10-CM

## 2021-09-15 DIAGNOSIS — R53.83 FATIGUE, UNSPECIFIED TYPE: ICD-10-CM

## 2021-09-15 PROCEDURE — 99442 PR PHYS/QHP TELEPHONE EVALUATION 11-20 MIN: CPT | Performed by: NURSE PRACTITIONER

## 2021-09-15 RX ORDER — PREDNISONE 10 MG/1
TABLET ORAL
Qty: 21 TABLET | Refills: 0 | Status: SHIPPED | OUTPATIENT
Start: 2021-09-15

## 2021-09-15 RX ORDER — AZELASTINE 1 MG/ML
1 SPRAY, METERED NASAL 2 TIMES DAILY
Qty: 1 EACH | Refills: 0 | Status: SHIPPED | OUTPATIENT
Start: 2021-09-15

## 2021-09-15 NOTE — PROGRESS NOTES
1. Have you been to the ER, urgent care clinic since your last visit? Hospitalized since your last visit? No    2. Have you seen or consulted any other health care providers outside of the [address] [place] [address] since your last visit? Include any pap smears or colon screening.  No

## 2021-09-15 NOTE — LETTER
9/18/2021 12:39 PM    Mr. Heavenly Garcia  [address] [facility] [address] [facility] [address]    Please excuse Mr. Jaime Walsh from work 9/15/21-9/24/21. If there are any issues please have Mr. Jaime Walsh call our office.     Sincerely,      Jerel Bravo NP

## 2021-09-15 NOTE — PROGRESS NOTES
Monica Tesfaye is a 62 y.o. male evaluated via telephone on 9/15/2021. Consent:  He and/or health care decision maker is aware that that he may receive a bill for this telephone service, depending on his insurance coverage, and has provided verbal consent to proceed: Yes    New issues:     Chief Complaint   Patient presents with    Cold Symptoms     He reports 2 days of runny nose and fatigue. Feels very congested. Last night he was sweating and had the chills, did not take his temperature. He went to [facility] [place] today and bought a covid test. Covid test was positive. He is vaccinated. He is requesting something to help with the congestion and wants a repeat covid test to confirm his results. Assessment/Plan:  viral infection  -Suspect he is positive for covid-19 viral infection  -Come to the office today and we will test you for covid, call when you arrive. We will come to your car to perform the nasal swab. Will call with covid-19 results.  -Start taking prednisone   -Start taking Astelin  F/U as needed  Educated to quarantine while awaiting covid results, verbalizes understanding. Documentation:  I communicated with the patient and/or health care decision maker about covid-19 infection. Details of this discussion including any medical advice provided: see above      I affirm this is a Patient Initiated Episode with an Established Patient who has not had a related appointment within my department in the past 7 days or scheduled within the next 24 hours.     Total Time: minutes: 11-20 minutes    Note: not billable if this call serves to triage the patient into an appointment for the relevant concern      Jesica Allen NP

## 2021-09-15 NOTE — LETTER
9/18/2021 12:39 PM    Mr. Lyndsay Griggs  [address] [place] [phone]    Please excuse Mr. Devota Frankel from work 9/15/21-9/24/21. If there are any issues please have Mr. Devota Frankel call our office.     Sincerely,      Braden Villalta NP

## 2021-09-15 NOTE — LETTER
9/15/2021 1:45 PM    Mr. Lyndsay Griggs  [address] [place] [phone]      Please excuse Mr. Devota Frankel from work 9/15/21-9/19/21 pending his test results. If there are any issues please have Mr. Devota Frankel call our office.       Sincerely,      Braden Villalta, NP

## 2021-09-18 LAB
SARS-COV-2, NAA 2 DAY TAT: NORMAL
SARS-COV-2, NAA: DETECTED

## 2021-09-18 NOTE — PROGRESS NOTES
Spoke with patient over the phone regarding positive covid test. He will continue to quarantine for a total of 10days from symptom onset.

## 2021-09-20 ENCOUNTER — TELEPHONE (OUTPATIENT)
Dept: FAMILY MEDICINE CLINIC | Age: 61
End: 2021-09-20

## 2021-09-20 NOTE — TELEPHONE ENCOUNTER
Pt wants a letter for work cover him for two days before being seen through when he should go back to work.  Also wants a copy of the positive test.

## 2021-09-27 ENCOUNTER — TELEPHONE (OUTPATIENT)
Dept: FAMILY MEDICINE CLINIC | Age: 61
End: 2021-09-27

## 2021-09-27 NOTE — TELEPHONE ENCOUNTER
Patient called and stated he is still coughing up a lot of phlegm. It was a darker brown but it is getting lighter. He wanted to know if there was something else he needed to take.   He wanted a nurse to call him back

## 2021-09-28 ENCOUNTER — VIRTUAL VISIT (OUTPATIENT)
Dept: FAMILY MEDICINE CLINIC | Age: 61
End: 2021-09-28
Payer: COMMERCIAL

## 2021-09-28 DIAGNOSIS — J20.9 ACUTE BRONCHITIS, UNSPECIFIED ORGANISM: Primary | ICD-10-CM

## 2021-09-28 PROCEDURE — 99441 PR PHYS/QHP TELEPHONE EVALUATION 5-10 MIN: CPT | Performed by: NURSE PRACTITIONER

## 2021-09-28 RX ORDER — AZITHROMYCIN 250 MG/1
TABLET, FILM COATED ORAL
Qty: 6 TABLET | Refills: 0 | Status: SHIPPED | OUTPATIENT
Start: 2021-09-28 | End: 2021-10-03

## 2021-09-28 NOTE — PROGRESS NOTES
Radha Christina is a 62 y.o. male evaluated via telephone on 9/28/2021. Consent:  He and/or health care decision maker is aware that that he may receive a bill for this telephone service, depending on his insurance coverage, and has provided verbal consent to proceed: Yes    Covid-19 viral infection  Mayo Brandi tested positive for covid 19 on 9/16/21. Reports feeling better after taking the steroids, but then three days ago he started coughing up brown phlegm. The cough is bothering him, but he states he is not coughing up as much phlegm today. Denies fevers, CP, SOB. Acute bronchitis  Start Azithromycin 250mg- take 2 tabs po x 1 today then take 1 tab daily x 4 days  Drink plenty of fluids and rest.  Go to ER for SOB, Cp, or wheezing  RTO if symptoms do not improve. Documentation:  I communicated with the patient and/or health care decision maker about viral infection and treatment plan. Details of this discussion including any medical advice provided: see above      I affirm this is a Patient Initiated Episode with an Established Patient who has not had a related appointment within my department in the past 7 days or scheduled within the next 24 hours.     Total Time: minutes: 5-10 minutes    Note: not billable if this call serves to triage the patient into an appointment for the relevant concern      Raghav Mancia NP

## 2022-03-18 PROBLEM — R79.89 ELEVATED TROPONIN: Status: ACTIVE | Noted: 2020-09-22

## 2022-03-18 PROBLEM — R77.8 ELEVATED TROPONIN: Status: ACTIVE | Noted: 2020-09-22

## 2022-03-18 PROBLEM — Z91.199 NONCOMPLIANCE: Status: ACTIVE | Noted: 2018-02-01

## 2022-03-19 PROBLEM — M70.71 ISCHIAL BURSITIS OF RIGHT SIDE: Status: ACTIVE | Noted: 2018-07-02

## 2022-03-19 PROBLEM — R07.9 CHEST PAIN: Status: ACTIVE | Noted: 2020-09-22

## 2022-03-19 PROBLEM — E66.3 OVERWEIGHT: Status: ACTIVE | Noted: 2020-07-27

## 2022-03-19 PROBLEM — M26.609 TMJ (TEMPOROMANDIBULAR JOINT DISORDER): Status: ACTIVE | Noted: 2019-11-26

## 2022-09-26 ENCOUNTER — TELEPHONE (OUTPATIENT)
Dept: FAMILY MEDICINE CLINIC | Age: 62
End: 2022-09-26

## 2022-09-26 NOTE — TELEPHONE ENCOUNTER
----- Message from [facility] sent at 9/26/2022  2:24 PM EDT -----  Subject: Referral Request    Reason for referral request? Psychiatrist requested   Provider patient wants to be referred to(if known):     Provider Phone Number(if known):     Additional Information for Provider?   ---------------------------------------------------------------------------  --------------  [address] [employer]    [phone]; OK to leave message on voicemail  ---------------------------------------------------------------------------  --------------

## 2022-10-12 NOTE — PROGRESS NOTES
Gale Anderson is a 65 y.o. male who presents today with c/o   Chief Complaint   Patient presents with    Follow-up     Patient has PTSD       Assessment/ Plan:         ICD-10-CM ICD-9-CM    1. PTSD (post-traumatic stress disorder)  F43.10 309.81       2. Right hip pain  M25.551 719.45       3. Right arm pain  M79.601 729.5         [employer] contact information provided for patient  Work note provided for patient not to lift more than 35 # until follow up with the orthopedic    Follow up with orthopedic with the [facility] in 3 months for assessment    No orders of the defined types were placed in this encounter. Follow-up and Dispositions    Return if symptoms worsen or fail to improve. Subjective:  Gale Anderson is a 65 y.o. male here today for for a referral. He reports anxiety and depression. He is retired . He looked up all the symptoms of PTSD and states that is exactly what he is dealing with. He would like my to provide a recommendation for someone he could talk with regarding his PTSD. He does not wish to be medicated at this time. Denies SI/HI. I will provide him with the appropriate resources today. Of note, he did have an appointment with the [place] recently and the [place] [facility] is planning to manage his right hip pain with steroid injections. He also reports ongoing pain to the right arm which he states was from someone hitting a nerve drawing blood. He has had difficulty lifting heavy objects at work and would like me to write him a note to restrict his lifting for another month while he is being treated with injection and followed by ortho at the [place]. I have offered to provide him with a letter stating he needs a restriction on lifting greater than 35#.          Patient Active Problem List   Diagnosis Code    Contact dermatitis L25.9    Essential hypertension, benign I10    Noncompliance Z91.199    Ischial bursitis of right side M70.71    TMJ (temporomandibular joint disorder) M26.609    Overweight E66.3    Chest pain R07.9    Elevated troponin R77.8       Past Medical History:   Diagnosis Date    Essential hypertension, benign 2015         Current Outpatient Medications:     predniSONE (STERAPRED DS) 10 mg dose pack, See administration instruction per 10mg dose pack, Disp: 21 Tablet, Rfl: 0    azelastine (ASTELIN) 137 mcg (0.1 %) nasal spray, 1 [place] by Both Nostrils route two (2) times a day. Use in each nostril as directed, Disp: 1 Each, Rfl: 0    lisinopriL (PRINIVIL, ZESTRIL) 40 mg tablet, Take 1 Tab by mouth daily. , Disp: 30 Tab, Rfl: 1    metoprolol tartrate (LOPRESSOR) 50 mg tablet, Take 1 Tab by mouth every twelve (12) hours. , Disp: 60 Tab, Rfl: 1    aspirin 81 mg chewable tablet, Take 1 Tab by mouth daily. , Disp: 30 Tab, Rfl: 0    omeprazole (PRILOSEC OTC) 20 mg tablet, Take 20 mg by mouth daily. , Disp: , Rfl:     meclizine (ANTIVERT) 12.5 mg tablet, Take 1 Tab by mouth three (3) times daily as needed for Dizziness. Indications: sensation of spinning or whirling (Patient not taking: Reported on 10/18/2022), Disp: 30 Tab, Rfl: 1    Allergies   Allergen Reactions    Onion Itching     Throat starts itching       History reviewed. No pertinent surgical history. Social History     Tobacco Use   Smoking Status Some Days   Smokeless Tobacco Never       Social History     Socioeconomic History    Marital status:    Tobacco Use    Smoking status: Some Days    Smokeless tobacco: Never   Substance and Sexual Activity    Alcohol use: No    Drug use: No    Sexual activity: Yes     Partners: Female     Birth control/protection: None       History reviewed. No pertinent family history. ROS:  Review of Systems   Constitutional:  Negative for chills, fever and weight loss. HENT:  Negative for hearing loss and tinnitus. Eyes:  Negative for blurred vision. Respiratory:  Negative for cough.     Cardiovascular:  Negative for chest pain and leg swelling. Gastrointestinal:  Negative for heartburn. Genitourinary:  Negative for dysuria. Musculoskeletal:  Positive for joint pain. Skin:  Negative for itching and rash. Neurological:  Negative for dizziness. Psychiatric/Behavioral:  Positive for depression. The patient is nervous/anxious and has insomnia. Objective:     Visit Vitals  /80 (BP 1 Location: Left upper arm, BP Patient Position: At rest, BP Cuff Size: Adult)   Pulse 63   Temp 97 °F (36.1 °C) (Temporal)   Resp 18   Ht 5' 7\" (1.702 m)   Wt 188 lb (85.3 kg)   SpO2 97%   BMI 29.44 kg/m²     Body mass index is 29.44 kg/m². Physical Exam  Vitals reviewed. Constitutional:       General: He is not in acute distress. Appearance: He is not ill-appearing or toxic-appearing. HENT:      Head: Normocephalic. Right Ear: External ear normal.      Left Ear: External ear normal.      Nose: Nose normal.      Mouth/Throat:      Mouth: Mucous membranes are moist.   Eyes:      Pupils: Pupils are equal, round, and reactive to light. Cardiovascular:      Pulses: Normal pulses. Pulmonary:      Effort: Pulmonary effort is normal.   Abdominal:      General: Abdomen is flat. Musculoskeletal:      Cervical back: Normal range of motion. Right hip: Decreased range of motion (d/t pain). Comments: Normal ROM to RUE   Skin:     General: Skin is warm. Neurological:      Mental Status: He is alert and oriented to person, place, and time. Psychiatric:         Mood and Affect: Mood normal.         Behavior: Behavior normal.       Results for orders placed or performed in visit on 09/15/21   NOVEL CORONAVIRUS (COVID-19)   Result Value Ref Range    SARS-CoV-2, THA Detected (A) Not Detected   SARS-COV-2, THA 2 DAY TAT   Result Value Ref Range    SARS-CoV-2, THA 2 DAY TAT Performed        No results found for this visit on 10/18/22. Verbal and written instructions (see AVS) provided.   Patient expresses understanding of diagnosis and treatment plan. Follow-up and Dispositions    Return if symptoms worsen or fail to improve. Patient has been advised to contact practice or seek care if condition persists or worsens.      Kellie De La Cruz NP

## 2022-10-18 ENCOUNTER — OFFICE VISIT (OUTPATIENT)
Dept: FAMILY MEDICINE CLINIC | Age: 62
End: 2022-10-18
Payer: COMMERCIAL

## 2022-10-18 VITALS
OXYGEN SATURATION: 97 % | WEIGHT: 188 LBS | TEMPERATURE: 97 F | DIASTOLIC BLOOD PRESSURE: 80 MMHG | RESPIRATION RATE: 18 BRPM | SYSTOLIC BLOOD PRESSURE: 139 MMHG | HEIGHT: 67 IN | HEART RATE: 63 BPM | BODY MASS INDEX: 29.51 KG/M2

## 2022-10-18 DIAGNOSIS — F43.10 PTSD (POST-TRAUMATIC STRESS DISORDER): Primary | ICD-10-CM

## 2022-10-18 DIAGNOSIS — M79.601 RIGHT ARM PAIN: ICD-10-CM

## 2022-10-18 DIAGNOSIS — M25.551 RIGHT HIP PAIN: ICD-10-CM

## 2022-10-18 PROCEDURE — 99213 OFFICE O/P EST LOW 20 MIN: CPT | Performed by: NURSE PRACTITIONER

## 2022-10-18 NOTE — LETTER
10/18/2022 10:06 AM    Patient:  Erickson Doty   YOB: 1960  Date of Visit: 10/18/2022          Mr Elvis Other was seen in our office today. Recommend patient remains on a lifting weight restriction of 35 pounds for another month. This is due to his right arm pain and right hip pain.        Sincerely,      Abelardo Umana NP

## 2022-10-18 NOTE — PROGRESS NOTES
1. \"Have you been to the ER, urgent care clinic since your last visit? Hospitalized since your last visit? \" No    2. \"Have you seen or consulted any other health care providers outside of the [address] [place] [address] since your last visit? \" No     3. For patients aged 39-70: Has the patient had a colonoscopy / FIT/ Cologuard? Yes - no Care Gap present      If the patient is female:    4. For patients aged 41-77: Has the patient had a mammogram within the past 2 years? NA - based on age or sex      11. For patients aged 21-65: Has the patient had a pap smear?  No

## 2023-01-07 NOTE — PROGRESS NOTES
Debra Martinez is a 65 y.o. male who presents today with c/o   Chief Complaint   Patient presents with    Follow-up     3 months    Hypertension       Assessment/ Plan:         ICD-10-CM ICD-9-CM    1. Hypertension, unspecified type  I10 401.9 CBC WITH AUTOMATED DIFF      METABOLIC PANEL, COMPREHENSIVE      LIPID PANEL      TSH 3RD GENERATION      TSH 3RD GENERATION      LIPID PANEL      METABOLIC PANEL, COMPREHENSIVE      CBC WITH AUTOMATED DIFF      MA COLLECTION VENOUS BLOOD VENIPUNCTURE      2. Right hip pain  M25.551 719.45 MA COLLECTION VENOUS BLOOD VENIPUNCTURE      3. Neuropathy  G62.9 355.9 MA COLLECTION VENOUS BLOOD VENIPUNCTURE      4. Elevated glucose  R73.09 790.29 HEMOGLOBIN A1C WITH EAG      HEMOGLOBIN A1C WITH EAG        [facility]  [facility] today  Start cymbalta and follow up in one month    Orders Placed This Encounter    CBC WITH AUTOMATED DIFF     Standing Status:   Future     Number of Occurrences:   1     Standing Expiration Date:   4/4/6795    METABOLIC PANEL, COMPREHENSIVE     Standing Status:   Future     Number of Occurrences:   1     Standing Expiration Date:   1/7/2024    LIPID PANEL     Standing Status:   Future     Number of Occurrences:   1     Standing Expiration Date:   1/7/2024    TSH 3RD GENERATION     Standing Status:   Future     Number of Occurrences:   1     Standing Expiration Date:   1/7/2024    HEMOGLOBIN A1C WITH EAG     Standing Status:   Future     Number of Occurrences:   1     Standing Expiration Date:   1/18/2024    MA COLLECTION VENOUS BLOOD VENIPUNCTURE    DULoxetine (CYMBALTA) 20 mg capsule     Sig: Take 1 Capsule by mouth daily. Indications: neuropathic pain     Dispense:  90 Capsule     Refill:  0         Follow-up and Dispositions    Return in about 1 month (around 2/18/2023). Subjective:  Debra Martinez is a 65 y.o. male here today for follow-up. He reports ongoing neuropathy in his lower extremities.  States his feet feel numb with trouble ambulating due to the pins and needle feeling. Denies bowel/bladder incontinence. We will check CBC, CMP, TSH and A1C today. We will also start him on a low dose cymbalta. Of note, he did have an appointment with the [place] recently and the [place] orth[facility] is planning to manage his right hip pain with steroid injections. Planning for upcoming surgery if patient consents. HTN  B/P today. Denies CP, SOB, edema. We will check labs today. Patient Active Problem List   Diagnosis Code    Contact dermatitis L25.9    Essential hypertension, benign I10    Noncompliance Z91.199    Ischial bursitis of right side M70.71    TMJ (temporomandibular joint disorder) M26.609    Overweight E66.3    Chest pain R07.9    Elevated troponin R77.8       Past Medical History:   Diagnosis Date    Essential hypertension, benign 2015         Current Outpatient Medications:     DULoxetine (CYMBALTA) 20 mg capsule, Take 1 Capsule by mouth daily. Indications: neuropathic pain, Disp: 90 Capsule, Rfl: 0    azelastine (ASTELIN) 137 mcg (0.1 %) nasal spray, 1 [place] by Both Nostrils route two (2) times a day. Use in each nostril as directed, Disp: 1 Each, Rfl: 0    lisinopriL (PRINIVIL, ZESTRIL) 40 mg tablet, Take 1 Tab by mouth daily. , Disp: 30 Tab, Rfl: 1    metoprolol tartrate (LOPRESSOR) 50 mg tablet, Take 1 Tab by mouth every twelve (12) hours. , Disp: 60 Tab, Rfl: 1    aspirin 81 mg chewable tablet, Take 1 Tab by mouth daily. , Disp: 30 Tab, Rfl: 0    omeprazole (PRILOSEC OTC) 20 mg tablet, Take 20 mg by mouth daily. , Disp: , Rfl:     meclizine (ANTIVERT) 12.5 mg tablet, Take 1 Tab by mouth three (3) times daily as needed for Dizziness. Indications: sensation of spinning or whirling (Patient not taking: No sig reported), Disp: 30 Tab, Rfl: 1    Allergies   Allergen Reactions    Onion Itching     Throat starts itching       History reviewed. No pertinent surgical history.     Social History Tobacco Use   Smoking Status Some Days   Smokeless Tobacco Never       Social History     Socioeconomic History    Marital status:    Tobacco Use    Smoking status: Some Days    Smokeless tobacco: Never   Substance and Sexual Activity    Alcohol use: No    Drug use: No    Sexual activity: Yes     Partners: Female     Birth control/protection: None       History reviewed. No pertinent family history. ROS:  Review of Systems   Constitutional:  Negative for chills, fever and weight loss. HENT:  Negative for hearing loss and tinnitus. Eyes:  Negative for blurred vision. Respiratory:  Negative for cough. Cardiovascular:  Negative for chest pain and leg swelling. Gastrointestinal:  Negative for heartburn. Genitourinary:  Negative for dysuria. Musculoskeletal:  Positive for joint pain. Skin:  Negative for itching and rash. Neurological:  Negative for dizziness. +neuropathy   Psychiatric/Behavioral:  Positive for depression. The patient is nervous/anxious and has insomnia. Objective:     Visit Vitals  /72 (BP 1 Location: Left upper arm, BP Patient Position: At rest, BP Cuff Size: Adult)   Pulse 74   Temp 97.8 °F (36.6 °C) (Oral)   Resp 16   Ht 5' 7\" (1.702 m)   Wt 185 lb (83.9 kg)   SpO2 97%   BMI 28.98 kg/m²     Body mass index is 28.98 kg/m². Physical Exam  Vitals reviewed. Constitutional:       General: He is not in acute distress. Appearance: He is not ill-appearing or toxic-appearing. HENT:      Head: Normocephalic. Right Ear: External ear normal.      Left Ear: External ear normal.      Nose: Nose normal.      Mouth/Throat:      Mouth: Mucous membranes are moist.   Eyes:      Pupils: Pupils are equal, round, and reactive to light. Cardiovascular:      Pulses: Normal pulses. Pulmonary:      Effort: Pulmonary effort is normal.   Abdominal:      General: Abdomen is flat. Musculoskeletal:      Cervical back: Normal range of motion. Right hip: Decreased range of motion (d/t pain). Skin:     General: Skin is warm. Neurological:      Mental Status: He is alert and oriented to person, place, and time. Psychiatric:         Mood and Affect: Mood normal.         Behavior: Behavior normal.           No results found for this visit on 01/18/23. Verbal and written instructions (see AVS) provided. Patient expresses understanding of diagnosis and treatment plan. Follow-up and Dispositions    Return in about 1 month (around 2/18/2023). Patient has been advised to contact practice or seek care if condition persists or worsens.      Trey Vicentes, NP

## 2023-01-18 ENCOUNTER — OFFICE VISIT (OUTPATIENT)
Dept: FAMILY MEDICINE CLINIC | Age: 63
End: 2023-01-18
Payer: COMMERCIAL

## 2023-01-18 VITALS
RESPIRATION RATE: 16 BRPM | HEIGHT: 67 IN | SYSTOLIC BLOOD PRESSURE: 123 MMHG | BODY MASS INDEX: 29.03 KG/M2 | TEMPERATURE: 97.8 F | OXYGEN SATURATION: 97 % | WEIGHT: 185 LBS | DIASTOLIC BLOOD PRESSURE: 72 MMHG | HEART RATE: 74 BPM

## 2023-01-18 DIAGNOSIS — R73.09 ELEVATED GLUCOSE: ICD-10-CM

## 2023-01-18 DIAGNOSIS — G62.9 NEUROPATHY: ICD-10-CM

## 2023-01-18 DIAGNOSIS — M25.551 RIGHT HIP PAIN: ICD-10-CM

## 2023-01-18 DIAGNOSIS — I10 HYPERTENSION, UNSPECIFIED TYPE: Primary | ICD-10-CM

## 2023-01-18 PROCEDURE — 99214 OFFICE O/P EST MOD 30 MIN: CPT | Performed by: NURSE PRACTITIONER

## 2023-01-18 PROCEDURE — 3078F DIAST BP <80 MM HG: CPT | Performed by: NURSE PRACTITIONER

## 2023-01-18 PROCEDURE — 36415 COLL VENOUS BLD VENIPUNCTURE: CPT | Performed by: NURSE PRACTITIONER

## 2023-01-18 PROCEDURE — 3074F SYST BP LT 130 MM HG: CPT | Performed by: NURSE PRACTITIONER

## 2023-01-18 RX ORDER — DULOXETIN HYDROCHLORIDE 20 MG/1
20 CAPSULE, DELAYED RELEASE ORAL DAILY
Qty: 90 CAPSULE | Refills: 0 | Status: SHIPPED | OUTPATIENT
Start: 2023-01-18

## 2023-01-18 NOTE — PROGRESS NOTES
1. \"Have you been to the ER, urgent care clinic since your last visit? Hospitalized since your last visit? \" No    2. \"Have you seen or consulted any other health care providers outside of the [address] [place] [address] since your last visit? \" No     3. For patients aged 39-70: Has the patient had a colonoscopy / FIT/ Cologuard? NO    If the patient is female:    4. For patients aged 41-77: Has the patient had a mammogram within the past 2 years? No      5. For patients aged 21-65: Has the patient had a pap smear?  NA - based on age or sex

## 2023-01-19 LAB
ALBUMIN SERPL-MCNC: 4 G/DL (ref 3.5–5)
ALBUMIN/GLOB SERPL: 1.3 (ref 1.1–2.2)
ALP SERPL-CCNC: 114 U/L (ref 45–117)
ALT SERPL-CCNC: 46 U/L (ref 12–78)
ANION GAP SERPL CALC-SCNC: 5 MMOL/L (ref 5–15)
AST SERPL-CCNC: 34 U/L (ref 15–37)
BASOPHILS # BLD: 0.1 K/UL (ref 0–0.1)
BASOPHILS NFR BLD: 1 % (ref 0–1)
BILIRUB SERPL-MCNC: 0.7 MG/DL (ref 0.2–1)
BUN SERPL-MCNC: 19 MG/DL (ref 6–20)
BUN/CREAT SERPL: 20 (ref 12–20)
CALCIUM SERPL-MCNC: 9.1 MG/DL (ref 8.5–10.1)
CHLORIDE SERPL-SCNC: 108 MMOL/L (ref 97–108)
CHOLEST SERPL-MCNC: 145 MG/DL
CO2 SERPL-SCNC: 27 MMOL/L (ref 21–32)
CREAT SERPL-MCNC: 0.95 MG/DL (ref 0.7–1.3)
DIFFERENTIAL METHOD BLD: ABNORMAL
EOSINOPHIL # BLD: 0.1 K/UL (ref 0–0.4)
EOSINOPHIL NFR BLD: 2 % (ref 0–7)
ERYTHROCYTE [DISTWIDTH] IN BLOOD BY AUTOMATED COUNT: 13.6 % (ref 11.5–14.5)
EST. AVERAGE GLUCOSE BLD GHB EST-MCNC: 120 MG/DL
GLOBULIN SER CALC-MCNC: 3 G/DL (ref 2–4)
GLUCOSE SERPL-MCNC: 105 MG/DL (ref 65–100)
HBA1C MFR BLD: 5.8 % (ref 4–5.6)
HCT VFR BLD AUTO: 49.9 % (ref 36.6–50.3)
HDLC SERPL-MCNC: 44 MG/DL
HDLC SERPL: 3.3 (ref 0–5)
HGB BLD-MCNC: 15.7 G/DL (ref 12.1–17)
IMM GRANULOCYTES # BLD AUTO: 0 K/UL (ref 0–0.04)
IMM GRANULOCYTES NFR BLD AUTO: 0 % (ref 0–0.5)
LDLC SERPL CALC-MCNC: 79.8 MG/DL (ref 0–100)
LYMPHOCYTES # BLD: 3.1 K/UL (ref 0.8–3.5)
LYMPHOCYTES NFR BLD: 52 % (ref 12–49)
MCH RBC QN AUTO: 27 PG (ref 26–34)
MCHC RBC AUTO-ENTMCNC: 31.5 G/DL (ref 30–36.5)
MCV RBC AUTO: 85.9 FL (ref 80–99)
MONOCYTES # BLD: 0.7 K/UL (ref 0–1)
MONOCYTES NFR BLD: 11 % (ref 5–13)
NEUTS SEG # BLD: 2 K/UL (ref 1.8–8)
NEUTS SEG NFR BLD: 34 % (ref 32–75)
NRBC # BLD: 0 K/UL (ref 0–0.01)
NRBC BLD-RTO: 0 PER 100 WBC
PLATELET # BLD AUTO: 311 K/UL (ref 150–400)
PMV BLD AUTO: 11 FL (ref 8.9–12.9)
POTASSIUM SERPL-SCNC: 4 MMOL/L (ref 3.5–5.1)
PROT SERPL-MCNC: 7 G/DL (ref 6.4–8.2)
RBC # BLD AUTO: 5.81 M/UL (ref 4.1–5.7)
SODIUM SERPL-SCNC: 140 MMOL/L (ref 136–145)
TRIGL SERPL-MCNC: 106 MG/DL (ref ?–150)
TSH SERPL DL<=0.05 MIU/L-ACNC: 1.66 UIU/ML (ref 0.36–3.74)
VLDLC SERPL CALC-MCNC: 21.2 MG/DL
WBC # BLD AUTO: 6 K/UL (ref 4.1–11.1)

## 2023-01-21 NOTE — PROGRESS NOTES
Cholesterol looks good. Kidney and liver function look good. The blood sugars have been running a little high in the \"pre-diabetic range. \" He should work on trying to decrease sugar in his diet if he has been eating more sweets than usual. This could be making the \"pins and needle feeling\" in his feet worse. If he gets the blood sugar down a little this can help with that feeling.

## 2023-02-22 ENCOUNTER — OFFICE VISIT (OUTPATIENT)
Dept: FAMILY MEDICINE CLINIC | Age: 63
End: 2023-02-22
Payer: COMMERCIAL

## 2023-02-22 VITALS
RESPIRATION RATE: 18 BRPM | OXYGEN SATURATION: 98 % | DIASTOLIC BLOOD PRESSURE: 68 MMHG | WEIGHT: 187 LBS | BODY MASS INDEX: 29.35 KG/M2 | HEART RATE: 71 BPM | TEMPERATURE: 97.5 F | HEIGHT: 67 IN | SYSTOLIC BLOOD PRESSURE: 110 MMHG

## 2023-02-22 DIAGNOSIS — G62.9 NEUROPATHY: ICD-10-CM

## 2023-02-22 DIAGNOSIS — M25.551 RIGHT HIP PAIN: Primary | ICD-10-CM

## 2023-02-22 PROCEDURE — 3078F DIAST BP <80 MM HG: CPT | Performed by: NURSE PRACTITIONER

## 2023-02-22 PROCEDURE — 3074F SYST BP LT 130 MM HG: CPT | Performed by: NURSE PRACTITIONER

## 2023-02-22 PROCEDURE — 99213 OFFICE O/P EST LOW 20 MIN: CPT | Performed by: NURSE PRACTITIONER

## 2023-02-22 RX ORDER — CYCLOBENZAPRINE HCL 10 MG
10 TABLET ORAL
Qty: 21 TABLET | Refills: 0 | Status: SHIPPED | OUTPATIENT
Start: 2023-02-22

## 2023-02-22 NOTE — PROGRESS NOTES
1. \"Have you been to the ER, urgent care clinic since your last visit? Hospitalized since your last visit? \" No    2. \"Have you seen or consulted any other health care providers outside of the [address] [place] [address] since your last visit? \" No     3. For patients aged 39-70: Has the patient had a colonoscopy / FIT/ Cologuard? No      If the patient is female:    4. For patients aged 41-77: Has the patient had a mammogram within the past 2 years? No      5. For patients aged 21-65: Has the patient had a pap smear?  No    Chief Complaint   Patient presents with    Follow-up       Visit Vitals  /68   Pulse 71   Temp 97.5 °F (36.4 °C) (Temporal)   Resp 18   Ht 5' 7\" (1.702 m)   Wt 187 lb (84.8 kg)   SpO2 98%   BMI 29.29 kg/m²

## 2023-02-22 NOTE — PROGRESS NOTES
Carlin Rausch is a 65 y.o. male who presents today with c/o   Chief Complaint   Patient presents with    Follow-up         Assessment/ Plan:         ICD-10-CM ICD-9-CM    1. Right hip pain  M25.551 719.45       2. Neuropathy  G62.9 355.9         Follow up with orthopedic with the [facility] for steroid injections and possible right hip replacement  He wants to try a muscle relaxer for his pain--will rx short course flexeril  Follow up PRN    No orders of the defined types were placed in this encounter. Subjective:  Carlin Rausch is a 65 y.o. male here today for follow-up. He reports ongoing neuropathy in his lower extremities. 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 States his feet feel numb with trouble ambulating due to the pins and needle feeling. Since he has a hx of high mileage running it is possible that some of his pain is from overuse injuries which he believes is the reason he could have so much right sided hip pain. Denies bowel/bladder incontinence. We started him on a low dose of cymbalta on his last OV, but he has not tried this yet. [facility] and they have recommended a hip replacement. Of note, blood sugars slightly elevated in the pre-diabetic range and patient admits to drinking a lot of soda and he will try and cut back on this to improve his blood sugars.   Lab Results   Component Value Date/Time    Hemoglobin A1c 5.8 (H) 01/18/2023 09:00 AM         Patient Active Problem List   Diagnosis Code    Contact dermatitis L25.9    Essential hypertension, benign I10    Noncompliance Z91.199    Ischial bursitis of right side M70.71    TMJ (temporomandibular joint disorder) M26.609    Overweight E66.3    Chest pain R07.9    Elevated troponin R77.8       Past Medical History:   Diagnosis Date    Essential hypertension, benign 2015         Current Outpatient Medications:     DULoxetine (CYMBALTA) 20 mg capsule, Take 1 Capsule by mouth daily. Indications: neuropathic pain, Disp: 90 Capsule, Rfl: 0    azelastine (ASTELIN) 137 mcg (0.1 %) nasal spray, 1 [place] by Both Nostrils route two (2) times a day. Use in each nostril as directed, Disp: 1 Each, Rfl: 0    lisinopriL (PRINIVIL, ZESTRIL) 40 mg tablet, Take 1 Tab by mouth daily. , Disp: 30 Tab, Rfl: 1    metoprolol tartrate (LOPRESSOR) 50 mg tablet, Take 1 Tab by mouth every twelve (12) hours. , Disp: 60 Tab, Rfl: 1    aspirin 81 mg chewable tablet, Take 1 Tab by mouth daily. , Disp: 30 Tab, Rfl: 0    omeprazole (PRILOSEC OTC) 20 mg tablet, Take 20 mg by mouth daily. , Disp: , Rfl:     meclizine (ANTIVERT) 12.5 mg tablet, Take 1 Tab by mouth three (3) times daily as needed for Dizziness. Indications: sensation of spinning or whirling, Disp: 30 Tab, Rfl: 1    Allergies   Allergen Reactions    Onion Itching     Throat starts itching       History reviewed. No pertinent surgical history. Social History     Tobacco Use   Smoking Status Some Days   Smokeless Tobacco Never       Social History     Socioeconomic History    Marital status:    Tobacco Use    Smoking status: Some Days    Smokeless tobacco: Never   Substance and Sexual Activity    Alcohol use: No    Drug use: No    Sexual activity: Yes     Partners: Female     Birth control/protection: None     Social Determinants of Health     Financial Resource Strain: High Risk    Difficulty of Paying Living Expenses: Hard   Food Insecurity: Food Insecurity Present    Worried About Running Out of Food in the Last Year: Sometimes true    Ran Out of Food in the Last Year: Often true       History reviewed. No pertinent family history. ROS:  Review of Systems   Constitutional:  Negative for chills, fever and weight loss. HENT:  Negative for hearing loss and tinnitus. Eyes:  Negative for blurred vision. Respiratory:  Negative for cough.     Cardiovascular:  Negative for chest pain and leg swelling. Gastrointestinal:  Negative for heartburn. Genitourinary:  Negative for dysuria. Musculoskeletal:  Positive for joint pain. Skin:  Negative for itching and rash. Neurological:  Negative for dizziness. +neuropathy       Objective:     Visit Vitals  /68   Pulse 71   Temp 97.5 °F (36.4 °C) (Temporal)   Resp 18   Ht 5' 7\" (1.702 m)   Wt 187 lb (84.8 kg)   SpO2 98%   BMI 29.29 kg/m²       Body mass index is 29.29 kg/m². Physical Exam  Vitals reviewed. Constitutional:       General: He is not in acute distress. Appearance: He is not ill-appearing or toxic-appearing. HENT:      Head: Normocephalic. Right Ear: External ear normal.      Left Ear: External ear normal.      Nose: Nose normal.      Mouth/Throat:      Mouth: Mucous membranes are moist.   Eyes:      Pupils: Pupils are equal, round, and reactive to light. Cardiovascular:      Pulses: Normal pulses. Pulmonary:      Effort: Pulmonary effort is normal.   Abdominal:      General: Abdomen is flat. Musculoskeletal:      Cervical back: Normal range of motion. Right hip: Decreased range of motion (d/t pain). Skin:     General: Skin is warm. Neurological:      Mental Status: He is alert and oriented to person, place, and time. Psychiatric:         Mood and Affect: Mood normal.         Behavior: Behavior normal.           No results found for this visit on 02/22/23. Verbal and written instructions (see AVS) provided. Patient expresses understanding of diagnosis and treatment plan. Patient has been advised to contact practice or seek care if condition persists or worsens.      Junior Souza NP

## 2023-04-20 ENCOUNTER — HOSPITAL ENCOUNTER (OUTPATIENT)
Dept: GENERAL RADIOLOGY | Age: 63
Discharge: HOME | End: 2023-04-20
Payer: COMMERCIAL

## 2023-04-20 ENCOUNTER — OFFICE VISIT (OUTPATIENT)
Dept: FAMILY MEDICINE CLINIC | Age: 63
End: 2023-04-20
Payer: COMMERCIAL

## 2023-04-20 ENCOUNTER — TELEPHONE (OUTPATIENT)
Dept: FAMILY MEDICINE CLINIC | Age: 63
End: 2023-04-20

## 2023-04-20 VITALS
HEIGHT: 67 IN | HEART RATE: 74 BPM | RESPIRATION RATE: 16 BRPM | TEMPERATURE: 98 F | WEIGHT: 184.6 LBS | DIASTOLIC BLOOD PRESSURE: 82 MMHG | BODY MASS INDEX: 28.97 KG/M2 | SYSTOLIC BLOOD PRESSURE: 132 MMHG | OXYGEN SATURATION: 96 %

## 2023-04-20 DIAGNOSIS — R07.9 CHEST PAIN, UNSPECIFIED TYPE: Primary | ICD-10-CM

## 2023-04-20 DIAGNOSIS — R07.9 CHEST PAIN, UNSPECIFIED TYPE: ICD-10-CM

## 2023-04-20 DIAGNOSIS — Z87.891 HISTORY OF TOBACCO USE: ICD-10-CM

## 2023-04-20 PROCEDURE — 99213 OFFICE O/P EST LOW 20 MIN: CPT | Performed by: NURSE PRACTITIONER

## 2023-04-20 PROCEDURE — 71046 X-RAY EXAM CHEST 2 VIEWS: CPT

## 2023-04-20 PROCEDURE — 93000 ELECTROCARDIOGRAM COMPLETE: CPT | Performed by: NURSE PRACTITIONER

## 2023-04-20 PROCEDURE — 3075F SYST BP GE 130 - 139MM HG: CPT | Performed by: NURSE PRACTITIONER

## 2023-04-20 PROCEDURE — 3079F DIAST BP 80-89 MM HG: CPT | Performed by: NURSE PRACTITIONER

## 2023-04-20 RX ORDER — LISINOPRIL AND HYDROCHLOROTHIAZIDE 20; 25 MG/1; MG/1
TABLET ORAL
Qty: 30 TABLET | Refills: 0
Start: 2023-04-20

## 2023-04-20 NOTE — PROGRESS NOTES
Subjective:     CC: chest pain    Tomasz Chavarria is a 65 y.o. male who is patient of EDEL Pond who presents today with c/o constant left sided chest pain X 1 week. He denies any sharp or shooting or burning pain. States it feels like someone is poking him in the chest with a dull object. His chest is also tender to palpation. He denies any associated SOB, coughing, dizziness, palpitations, extremity swelling. Denies any hearburn, belching, abdominal pain, or N/V. He denies any aggravating or alleviating factors. He has a hx of HTN but his BP is at goal today. He had a neg stress test in 2020. He sees a primary care provider at the [place] who is managing his BP medications. States he now on Lisinopril-HCTZ and Metoprolol with a baby aspirin daily. Used to smoke, quit in 2019 after he caught Covid. He has been working more and feels he could have a strained muscle. Seeing a psychiatrist for PTSD from his time in the [employer]. Is NOT taking Cymbalta. He has hx of GERD and taking PPI daily with good control of symptoms. Patient Active Problem List   Diagnosis Code    Contact dermatitis L25.9    Essential hypertension, benign I10    Noncompliance Z91.199    Ischial bursitis of right side M70.71    TMJ (temporomandibular joint disorder) M26.609    Overweight E66.3    Chest pain R07.9    Elevated troponin R77.8       Past Medical History:   Diagnosis Date    Essential hypertension, benign 2015         Current Outpatient Medications:     cyclobenzaprine (FLEXERIL) 10 mg tablet, Take 1 Tablet by mouth three (3) times daily as needed for Muscle Spasm(s). Indications: muscle spasm, Disp: 21 Tablet, Rfl: 0    DULoxetine (CYMBALTA) 20 mg capsule, Take 1 Capsule by mouth daily. Indications: neuropathic pain, Disp: 90 Capsule, Rfl: 0    azelastine (ASTELIN) 137 mcg (0.1 %) nasal spray, 1 [place] by Both Nostrils route two (2) times a day.  Use in each nostril as directed, Disp: 1 Each, Rfl: 0 lisinopriL (PRINIVIL, ZESTRIL) 40 mg tablet, Take 1 Tab by mouth daily. , Disp: 30 Tab, Rfl: 1    metoprolol tartrate (LOPRESSOR) 50 mg tablet, Take 1 Tab by mouth every twelve (12) hours. , Disp: 60 Tab, Rfl: 1    aspirin 81 mg chewable tablet, Take 1 Tab by mouth daily. , Disp: 30 Tab, Rfl: 0    omeprazole (PRILOSEC OTC) 20 mg tablet, Take 20 mg by mouth daily. , Disp: , Rfl:     meclizine (ANTIVERT) 12.5 mg tablet, Take 1 Tab by mouth three (3) times daily as needed for Dizziness. Indications: sensation of spinning or whirling, Disp: 30 Tab, Rfl: 1    Allergies   Allergen Reactions    Onion Itching     Throat starts itching       No past surgical history on file. Social History     Tobacco Use   Smoking Status Some Days   Smokeless Tobacco Never       Social History     Socioeconomic History    Marital status:    Tobacco Use    Smoking status: Some Days    Smokeless tobacco: Never   Substance and Sexual Activity    Alcohol use: No    Drug use: No    Sexual activity: Yes     Partners: Female     Birth control/protection: None     Social Determinants of Health     Financial Resource Strain: High Risk    Difficulty of Paying Living Expenses: Hard   Food Insecurity: Food Insecurity Present    Worried About Running Out of Food in the Last Year: Sometimes true    Ran Out of Food in the Last Year: Often true       No family history on file.     ROS:  Gen: denies fever, chills, or fatigue  HEENT:denies H/A, nasal congestion, or sore throat  Resp: denies dyspnea, cough, or wheezing  CV: +left sided chest pain, no pressure, or palpitations  Extremeties: denies edema  GI[de-identified] denies dyspepsia, abdominal pain, nausea, vomiting, diarrhea, or constipation  Musculoskeletal: no jaw or shoulder pain  Neuro: denies numbness/tingling or dizziness  Skin: denies rashes or new lesions       Objective:     Visit Vitals  /82 (BP 1 Location: Left upper arm)   Pulse 74   Temp 98 °F (36.7 °C)   Resp 16   Ht 5' 7\" (1.702 m)   Wt 184 lb 9.6 oz (83.7 kg)   SpO2 96%   BMI 28.91 kg/m²       General: Alert and oriented. No acute distress. Well nourished. HEENT :  Eyes: Sclera white, conjunctiva clear. PERRLA. Extra ocular movements intact. Neck: Supple with FROM. Lungs: Breathing even and unlabored. All lobes clear to auscultation bilaterally   Heart :RRR, S1 and S2 normal intensity, no extra heart sounds  Extremities: Non-edematous. Musculoskeletal: The left chest wall is TTP but without edema or palpable mass   Neuro: Cranial nerves grossly normal.  Psych: Mood and thought content appropriate for situation. Dressed appropriately and with good hygiene. Skin: Warm, dry, and intact. No lesions or discoloration. Assessment/ Plan:     Chest pain  EKG done- shows NSR without any concerning ST changes  I suspect this is related to costochondritis and pt was reassured today  Advised to try OTC pain relieving cream prn and give it 4-8 weeks to heal  Avoid heavy lifting, pulling, or pushing for a week or 2  Due to his hx of smoking we will get CXR just in case- will call with results  F/U prn if symptoms worsen or do not improve. Verbal and written instructions (see AVS) provided. Patient expresses understanding of diagnosis and treatment plan. Health Maintenance Due   Topic Date Due    Colorectal Cancer Screening Combo  Never done    Shingles Vaccine (1 of 2) Never done    COVID-19 Vaccine (3 - Booster for Subramanian Peter series) 06/04/2021               Kayode Choudhary.  Leroy Ormond, NP

## 2023-04-21 ENCOUNTER — TELEPHONE (OUTPATIENT)
Dept: FAMILY MEDICINE CLINIC | Age: 63
End: 2023-04-21

## 2023-04-21 DIAGNOSIS — F17.200 NICOTINE DEPENDENCE, UNCOMPLICATED, UNSPECIFIED NICOTINE PRODUCT TYPE: Primary | ICD-10-CM

## 2023-04-21 NOTE — TELEPHONE ENCOUNTER
Spoke with the patient and he wants to do low dose CT scan at [facility] can you please do an order

## 2023-05-01 ENCOUNTER — OFFICE VISIT (OUTPATIENT)
Dept: FAMILY MEDICINE CLINIC | Age: 63
End: 2023-05-01
Payer: COMMERCIAL

## 2023-05-01 VITALS
DIASTOLIC BLOOD PRESSURE: 82 MMHG | BODY MASS INDEX: 28.94 KG/M2 | HEART RATE: 62 BPM | TEMPERATURE: 97.8 F | WEIGHT: 184.8 LBS | RESPIRATION RATE: 16 BRPM | SYSTOLIC BLOOD PRESSURE: 124 MMHG | OXYGEN SATURATION: 99 %

## 2023-05-01 DIAGNOSIS — R07.89 TENDERNESS OF CHEST WALL: Primary | ICD-10-CM

## 2023-05-01 PROCEDURE — 3079F DIAST BP 80-89 MM HG: CPT | Performed by: NURSE PRACTITIONER

## 2023-05-01 PROCEDURE — 99213 OFFICE O/P EST LOW 20 MIN: CPT | Performed by: NURSE PRACTITIONER

## 2023-05-01 PROCEDURE — 3074F SYST BP LT 130 MM HG: CPT | Performed by: NURSE PRACTITIONER

## 2023-05-01 RX ORDER — DICLOFENAC SODIUM 75 MG/1
75 TABLET, DELAYED RELEASE ORAL 2 TIMES DAILY
Qty: 28 TABLET | Refills: 0 | Status: SHIPPED | OUTPATIENT
Start: 2023-05-01

## 2023-05-01 NOTE — PROGRESS NOTES
Identified pt with two pt identifiers(name and ). Reviewed record in preparation for visit and have obtained necessary documentation. 1. \"Have you been to the ER, urgent care clinic since your last visit? Hospitalized since your last visit? \" No    2. \"Have you seen or consulted any other health care providers outside of the [address] [place] [address] since your last visit? \" No     3. For patients aged 39-70: Has the patient had a colonoscopy / FIT/ Cologuard? No      If the patient is female:    4. For patients aged 41-77: Has the patient had a mammogram within the past 2 years? NA - based on age or sex      11. For patients aged 21-65: Has the patient had a pap smear?  NA - based on age or sex

## 2023-05-01 NOTE — PROGRESS NOTES
Tyson Vargas is a 65 y.o. male who presents today with c/o   Chief Complaint   Patient presents with    Other     Discuss xray results       Assessment/ Plan:     Tenderness of chest wall  Try diclofenac  Follow up if sx's do not improve    RTO in three months for routine follow up    Follow-up and Dispositions    Return in about 3 months (around 8/1/2023). Subjective:  Tyson Vargas is a 65 y.o. male here today to discuss his recent chest xray results on 4/20/2023. He was noted to have heavy costochondral calcification. He does report tenderness to his chest wall. Feels tender to palpation. He is scheduling his low dose CT scan to screen for lung CA with [facility] in [place]. Wants to consider a CT scan without the dye, but we discussed that this would not provide us as much information. He would like to know what type of medication he can try for the pain. Of note, he does report a history of depression. Denies SI/HI. Not interested in taking medication. Patient Active Problem List   Diagnosis Code    Contact dermatitis L25.9    Essential hypertension, benign I10    Noncompliance Z91.199    Ischial bursitis of right side M70.71    TMJ (temporomandibular joint disorder) M26.609    Overweight E66.3    Chest pain R07.9    Elevated troponin R77.8       Past Medical History:   Diagnosis Date    Essential hypertension, benign 2015         Current Outpatient Medications:     diclofenac EC (VOLTAREN) 75 mg EC tablet, Take 1 Tablet by mouth two (2) times a day., Disp: 28 Tablet, Rfl: 0    lisinopril-hydroCHLOROthiazide (PRINZIDE, ZESTORETIC) 20-25 mg per tablet, Take 1 tab daily (exact dose unknown- rec's from [place]), Disp: 30 Tablet, Rfl: 0    cyclobenzaprine (FLEXERIL) 10 mg tablet, Take 1 Tablet by mouth three (3) times daily as needed for Muscle Spasm(s).  Indications: muscle spasm, Disp: 21 Tablet, Rfl: 0    azelastine (ASTELIN) 137 mcg (0.1 %) nasal spray, 1 [place] by Both Nostrils route two (2) times a day. Use in each nostril as directed, Disp: 1 Each, Rfl: 0    metoprolol tartrate (LOPRESSOR) 50 mg tablet, Take 1 Tab by mouth every twelve (12) hours. , Disp: 60 Tab, Rfl: 1    aspirin 81 mg chewable tablet, Take 1 Tab by mouth daily. , Disp: 30 Tab, Rfl: 0    omeprazole (PRILOSEC OTC) 20 mg tablet, Take 20 mg by mouth daily. , Disp: , Rfl:     meclizine (ANTIVERT) 12.5 mg tablet, Take 1 Tab by mouth three (3) times daily as needed for Dizziness. Indications: sensation of spinning or whirling, Disp: 30 Tab, Rfl: 1    Allergies   Allergen Reactions    Onion Itching     Throat starts itching       History reviewed. No pertinent surgical history. Social History     Tobacco Use   Smoking Status Former    Packs/day: 0.75    Years: 30.00    Pack years: 22.50    Types: Cigarettes    Quit date: 2019    Years since quittin.3   Smokeless Tobacco Never       Social History     Socioeconomic History    Marital status:    Tobacco Use    Smoking status: Former     Packs/day: 0.75     Years: 30.00     Pack years: 22.50     Types: Cigarettes     Quit date: 2019     Years since quittin.3    Smokeless tobacco: Never   Substance and Sexual Activity    Alcohol use: No    Drug use: No    Sexual activity: Yes     Partners: Female     Birth control/protection: None     Social Determinants of Health     Financial Resource Strain: High Risk    Difficulty of Paying Living Expenses: Hard   Food Insecurity: Food Insecurity Present    Worried About Running Out of Food in the Last Year: Sometimes true    Ran Out of Food in the Last Year: Often true       History reviewed. No pertinent family history. ROS:  Review of Systems   Constitutional:  Negative for chills and fever. HENT:  Negative for hearing loss. Eyes:  Negative for blurred vision and double vision. Respiratory:  Negative for cough and shortness of breath.     Cardiovascular:  Negative for palpitations and leg swelling.        +chest wall tenderness Gastrointestinal:  Negative for heartburn. Genitourinary:  Negative for dysuria. Musculoskeletal:  Negative for myalgias. Skin:  Negative for rash. Objective:     Visit Vitals  /82 (BP 1 Location: Left upper arm)   Pulse 62   Temp 97.8 °F (36.6 °C) (Oral)   Resp 16   Wt 184 lb 12.8 oz (83.8 kg)   SpO2 99%   BMI 28.94 kg/m²     Body mass index is 28.94 kg/m². Physical Exam  Vitals reviewed. Constitutional:       General: He is not in acute distress. Appearance: He is not ill-appearing or toxic-appearing. HENT:      Head: Normocephalic. Right Ear: External ear normal.      Left Ear: External ear normal.      Nose: Nose normal.      Mouth/Throat:      Mouth: Mucous membranes are moist.   Eyes:      Pupils: Pupils are equal, round, and reactive to light. Cardiovascular:      Rate and Rhythm: Normal rate. Heart sounds: No murmur heard. Pulmonary:      Effort: Pulmonary effort is normal.      Breath sounds: Normal breath sounds. Abdominal:      General: Abdomen is flat. Musculoskeletal:         General: Normal range of motion. Cervical back: Normal range of motion. Comments: Pain with deep palpation of the chest wall   Skin:     General: Skin is warm. Neurological:      Mental Status: He is alert and oriented to person, place, and time. Psychiatric:         Mood and Affect: Mood normal.         Behavior: Behavior normal.           No results found for this visit on 05/01/23. Verbal and written instructions (see AVS) provided. Patient expresses understanding of diagnosis and treatment plan. Follow-up and Dispositions    Return in about 3 months (around 8/1/2023). Patient has been advised to contact practice or seek care if condition persists or worsens.      Melody Ahumada, NP

## 2023-05-02 DIAGNOSIS — F17.200 NICOTINE DEPENDENCE, UNCOMPLICATED, UNSPECIFIED NICOTINE PRODUCT TYPE: ICD-10-CM

## 2023-05-15 ENCOUNTER — CLINICAL DOCUMENTATION (OUTPATIENT)
Age: 63
End: 2023-05-15

## 2023-05-15 NOTE — PROGRESS NOTES
CT low dose lung screening completed-->mild emphysema with no suspicious nodule's. Recommend follow up in 12 months.

## 2023-05-26 RX ORDER — METOPROLOL TARTRATE 50 MG/1
50 TABLET, FILM COATED ORAL EVERY 12 HOURS
COMMUNITY
Start: 2020-09-24

## 2023-05-26 RX ORDER — AZELASTINE 1 MG/ML
1 SPRAY, METERED NASAL 2 TIMES DAILY
COMMUNITY
Start: 2021-09-15

## 2023-05-26 RX ORDER — CYCLOBENZAPRINE HCL 10 MG
10 TABLET ORAL 3 TIMES DAILY PRN
COMMUNITY
Start: 2023-02-22

## 2023-05-26 RX ORDER — MECLIZINE HCL 12.5 MG/1
12.5 TABLET ORAL 3 TIMES DAILY PRN
COMMUNITY
Start: 2020-07-27

## 2023-05-26 RX ORDER — DICLOFENAC SODIUM 75 MG/1
75 TABLET, DELAYED RELEASE ORAL 2 TIMES DAILY
COMMUNITY
Start: 2023-05-01

## 2023-05-26 RX ORDER — ASPIRIN 81 MG/1
81 TABLET, CHEWABLE ORAL DAILY
COMMUNITY
Start: 2020-09-23

## 2023-05-26 RX ORDER — LISINOPRIL AND HYDROCHLOROTHIAZIDE 25; 20 MG/1; MG/1
TABLET ORAL
COMMUNITY
Start: 2023-04-20

## 2023-05-26 RX ORDER — OMEPRAZOLE 20 MG/1
20 TABLET, DELAYED RELEASE ORAL DAILY
COMMUNITY

## 2023-06-02 ENCOUNTER — OFFICE VISIT (OUTPATIENT)
Age: 63
End: 2023-06-02
Payer: COMMERCIAL

## 2023-06-02 VITALS
HEART RATE: 76 BPM | HEIGHT: 67 IN | BODY MASS INDEX: 28.41 KG/M2 | TEMPERATURE: 98.2 F | DIASTOLIC BLOOD PRESSURE: 75 MMHG | SYSTOLIC BLOOD PRESSURE: 112 MMHG | WEIGHT: 181 LBS | RESPIRATION RATE: 20 BRPM | OXYGEN SATURATION: 97 %

## 2023-06-02 DIAGNOSIS — J30.9 CHRONIC ALLERGIC RHINITIS: ICD-10-CM

## 2023-06-02 DIAGNOSIS — J01.40 SUBACUTE PANSINUSITIS: Primary | ICD-10-CM

## 2023-06-02 PROCEDURE — 3078F DIAST BP <80 MM HG: CPT | Performed by: FAMILY MEDICINE

## 2023-06-02 PROCEDURE — 3074F SYST BP LT 130 MM HG: CPT | Performed by: FAMILY MEDICINE

## 2023-06-02 PROCEDURE — 99213 OFFICE O/P EST LOW 20 MIN: CPT | Performed by: FAMILY MEDICINE

## 2023-06-02 RX ORDER — CLINDAMYCIN HYDROCHLORIDE 150 MG/1
150 CAPSULE ORAL 3 TIMES DAILY
Qty: 30 CAPSULE | Refills: 0 | Status: SHIPPED | OUTPATIENT
Start: 2023-06-02 | End: 2023-06-12

## 2023-06-02 RX ORDER — PSEUDOEPHEDRINE HYDROCHLORIDE 60 MG/1
60 TABLET, FILM COATED ORAL EVERY 6 HOURS PRN
Qty: 40 TABLET | Refills: 0 | Status: SHIPPED | OUTPATIENT
Start: 2023-06-02

## 2023-06-02 RX ORDER — FLUTICASONE PROPIONATE 50 MCG
2 SPRAY, SUSPENSION (ML) NASAL DAILY
Qty: 16 G | Refills: 5 | Status: SHIPPED | OUTPATIENT
Start: 2023-06-02

## 2023-06-02 RX ORDER — PREDNISONE 20 MG/1
TABLET ORAL
Qty: 30 TABLET | Refills: 0 | Status: SHIPPED | OUTPATIENT
Start: 2023-06-02

## 2023-06-02 SDOH — ECONOMIC STABILITY: INCOME INSECURITY: HOW HARD IS IT FOR YOU TO PAY FOR THE VERY BASICS LIKE FOOD, HOUSING, MEDICAL CARE, AND HEATING?: NOT HARD AT ALL

## 2023-06-02 SDOH — ECONOMIC STABILITY: FOOD INSECURITY: WITHIN THE PAST 12 MONTHS, THE FOOD YOU BOUGHT JUST DIDN'T LAST AND YOU DIDN'T HAVE MONEY TO GET MORE.: NEVER TRUE

## 2023-06-02 SDOH — ECONOMIC STABILITY: FOOD INSECURITY: WITHIN THE PAST 12 MONTHS, YOU WORRIED THAT YOUR FOOD WOULD RUN OUT BEFORE YOU GOT MONEY TO BUY MORE.: NEVER TRUE

## 2023-06-02 SDOH — ECONOMIC STABILITY: HOUSING INSECURITY
IN THE LAST 12 MONTHS, WAS THERE A TIME WHEN YOU DID NOT HAVE A STEADY PLACE TO SLEEP OR SLEPT IN A SHELTER (INCLUDING NOW)?: NO

## 2023-06-02 ASSESSMENT — ENCOUNTER SYMPTOMS
EYE REDNESS: 0
RHINORRHEA: 1
APNEA: 0
SORE THROAT: 0
CHOKING: 0
EYE PAIN: 0
PHOTOPHOBIA: 0
NAUSEA: 0
CHEST TIGHTNESS: 0
SINUS PAIN: 1
WHEEZING: 0
COUGH: 1
VOICE CHANGE: 0
STRIDOR: 0
SINUS PRESSURE: 1
EYE DISCHARGE: 0
TROUBLE SWALLOWING: 0
FACIAL SWELLING: 0
EYE ITCHING: 0
SHORTNESS OF BREATH: 0
ABDOMINAL PAIN: 0

## 2023-06-02 ASSESSMENT — PATIENT HEALTH QUESTIONNAIRE - PHQ9
10. IF YOU CHECKED OFF ANY PROBLEMS, HOW DIFFICULT HAVE THESE PROBLEMS MADE IT FOR YOU TO DO YOUR WORK, TAKE CARE OF THINGS AT HOME, OR GET ALONG WITH OTHER PEOPLE: 0
8. MOVING OR SPEAKING SO SLOWLY THAT OTHER PEOPLE COULD HAVE NOTICED. OR THE OPPOSITE, BEING SO FIGETY OR RESTLESS THAT YOU HAVE BEEN MOVING AROUND A LOT MORE THAN USUAL: 0
SUM OF ALL RESPONSES TO PHQ9 QUESTIONS 1 & 2: 0
9. THOUGHTS THAT YOU WOULD BE BETTER OFF DEAD, OR OF HURTING YOURSELF: 0
SUM OF ALL RESPONSES TO PHQ QUESTIONS 1-9: 0
1. LITTLE INTEREST OR PLEASURE IN DOING THINGS: 0
3. TROUBLE FALLING OR STAYING ASLEEP: 0
4. FEELING TIRED OR HAVING LITTLE ENERGY: 0
7. TROUBLE CONCENTRATING ON THINGS, SUCH AS READING THE NEWSPAPER OR WATCHING TELEVISION: 0
SUM OF ALL RESPONSES TO PHQ QUESTIONS 1-9: 0
5. POOR APPETITE OR OVEREATING: 0
6. FEELING BAD ABOUT YOURSELF - OR THAT YOU ARE A FAILURE OR HAVE LET YOURSELF OR YOUR FAMILY DOWN: 0
2. FEELING DOWN, DEPRESSED OR HOPELESS: 0

## 2023-06-02 ASSESSMENT — ANXIETY QUESTIONNAIRES
IF YOU CHECKED OFF ANY PROBLEMS ON THIS QUESTIONNAIRE, HOW DIFFICULT HAVE THESE PROBLEMS MADE IT FOR YOU TO DO YOUR WORK, TAKE CARE OF THINGS AT HOME, OR GET ALONG WITH OTHER PEOPLE: NOT DIFFICULT AT ALL
7. FEELING AFRAID AS IF SOMETHING AWFUL MIGHT HAPPEN: 0
2. NOT BEING ABLE TO STOP OR CONTROL WORRYING: 0
6. BECOMING EASILY ANNOYED OR IRRITABLE: 0
3. WORRYING TOO MUCH ABOUT DIFFERENT THINGS: 0
4. TROUBLE RELAXING: 0
1. FEELING NERVOUS, ANXIOUS, OR ON EDGE: 0
GAD7 TOTAL SCORE: 0
5. BEING SO RESTLESS THAT IT IS HARD TO SIT STILL: 0

## 2023-06-02 NOTE — PROGRESS NOTES
Isatu Rothman is a 59 y.o. male who presents with the following:  Chief Complaint   Patient presents with    Sinus Problem    Cough       Patient has been dealing with a great deal of congestion this spring and has now started developing pain in his maxilla as well as his frontal sinus area and ethmoid area. Patient's had no fevers chills or sweats but he is having a great deal of postnasal drip which is causing him to cough especially at night when trying to sleep. Allergies   Allergen Reactions    Onion Itching     Throat starts itching       Current Outpatient Medications   Medication Sig Dispense Refill    predniSONE (DELTASONE) 20 MG tablet Take 5 daily for 4 days then 4 on day 5 and 3 on day 6 and 2 on day 7 and 1 on day 8 30 tablet 0    pseudoephedrine (SUDAFED) 60 MG tablet Take 1 tablet by mouth every 6 hours as needed for Congestion 40 tablet 0    clindamycin (CLEOCIN) 150 MG capsule Take 1 capsule by mouth 3 times daily for 10 days 30 capsule 0    fluticasone (FLONASE) 50 MCG/ACT nasal spray 2 sprays by Each Nostril route daily 16 g 5    aspirin 81 MG chewable tablet Take 1 tablet by mouth daily      azelastine (ASTELIN) 0.1 % nasal spray 1 spray by Nasal route 2 times daily      cyclobenzaprine (FLEXERIL) 10 MG tablet Take 1 tablet by mouth 3 times daily as needed      diclofenac (VOLTAREN) 75 MG EC tablet Take 1 tablet by mouth 2 times daily      lisinopril-hydroCHLOROthiazide (PRINZIDE;ZESTORETIC) 20-25 MG per tablet Take 1 tab daily (exact dose unknown- rec's from [facility] [address] [facility])      meclizine (ANTIVERT) 12.5 MG tablet Take 1 tablet by mouth 3 times daily as needed      metoprolol tartrate (LOPRESSOR) 50 MG tablet Take 1 tablet by mouth in the morning and 1 tablet in the evening. omeprazole (PRILOSEC OTC) 20 MG tablet Take 1 tablet by mouth daily       No current facility-administered medications for this visit.         Past Medical History:   Diagnosis Date    Essential hypertension, benign 2015

## 2023-09-05 ENCOUNTER — OFFICE VISIT (OUTPATIENT)
Age: 63
End: 2023-09-05
Payer: COMMERCIAL

## 2023-09-05 VITALS
HEART RATE: 78 BPM | TEMPERATURE: 98.5 F | RESPIRATION RATE: 18 BRPM | WEIGHT: 182.38 LBS | BODY MASS INDEX: 28.63 KG/M2 | DIASTOLIC BLOOD PRESSURE: 79 MMHG | OXYGEN SATURATION: 98 % | SYSTOLIC BLOOD PRESSURE: 121 MMHG | HEIGHT: 67 IN

## 2023-09-05 DIAGNOSIS — B88.0 CHIGGER BITES: Primary | ICD-10-CM

## 2023-09-05 PROCEDURE — 3078F DIAST BP <80 MM HG: CPT | Performed by: FAMILY MEDICINE

## 2023-09-05 PROCEDURE — 99213 OFFICE O/P EST LOW 20 MIN: CPT | Performed by: FAMILY MEDICINE

## 2023-09-05 PROCEDURE — 3074F SYST BP LT 130 MM HG: CPT | Performed by: FAMILY MEDICINE

## 2023-09-05 RX ORDER — PREDNISONE 20 MG/1
TABLET ORAL
Qty: 30 TABLET | Refills: 0 | Status: SHIPPED | OUTPATIENT
Start: 2023-09-05

## 2023-09-05 ASSESSMENT — ENCOUNTER SYMPTOMS
BLOOD IN STOOL: 0
DIARRHEA: 0
VOICE CHANGE: 0
CONSTIPATION: 0
EYE PAIN: 0
CHEST TIGHTNESS: 0
ABDOMINAL DISTENTION: 0
WHEEZING: 0
EYE REDNESS: 0
ABDOMINAL PAIN: 0
NAUSEA: 0
VOMITING: 0
SINUS PRESSURE: 0
BACK PAIN: 0
SINUS PAIN: 0
RHINORRHEA: 0
COUGH: 0
ANAL BLEEDING: 0
SHORTNESS OF BREATH: 0

## 2023-09-05 ASSESSMENT — PATIENT HEALTH QUESTIONNAIRE - PHQ9
SUM OF ALL RESPONSES TO PHQ9 QUESTIONS 1 & 2: 0
SUM OF ALL RESPONSES TO PHQ QUESTIONS 1-9: 0
2. FEELING DOWN, DEPRESSED OR HOPELESS: 0
SUM OF ALL RESPONSES TO PHQ QUESTIONS 1-9: 0
1. LITTLE INTEREST OR PLEASURE IN DOING THINGS: 0
SUM OF ALL RESPONSES TO PHQ QUESTIONS 1-9: 0
SUM OF ALL RESPONSES TO PHQ QUESTIONS 1-9: 0

## 2023-09-05 NOTE — PROGRESS NOTES
Del Bailon is a 59 y.o. 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 Allergies   Allergen Reactions    Onion Itching     Throat starts itching       Current Outpatient Medications   Medication Sig Dispense Refill    predniSONE (DELTASONE) 20 MG tablet Take 5 daily for 4 days then 4 on day 5 and 3 on day 6 and 2 on day 7 and 1 on day 8 30 tablet 0    pseudoephedrine (SUDAFED) 60 MG tablet Take 1 tablet by mouth every 6 hours as needed for Congestion 40 tablet 0    aspirin 81 MG chewable tablet Take 1 tablet by mouth daily      azelastine (ASTELIN) 0.1 % nasal spray 1 spray by Nasal route 2 times daily      cyclobenzaprine (FLEXERIL) 10 MG tablet Take 1 tablet by mouth 3 times daily as needed      diclofenac (VOLTAREN) 75 MG EC tablet Take 1 tablet by mouth 2 times daily      lisinopril-hydroCHLOROthiazide (PRINZIDE;ZESTORETIC) 20-25 MG per tablet Take 1 tab daily (exact dose unknown- rec's from [place])      meclizine (ANTIVERT) 12.5 MG tablet Take 1 tablet by mouth 3 times daily as needed      metoprolol tartrate (LOPRESSOR) 50 MG tablet Take 1 tablet by mouth in the morning and 1 tablet in the evening. omeprazole (PRILOSEC OTC) 20 MG tablet Take 1 tablet by mouth daily      fluticasone (FLONASE) 50 MCG/ACT nasal spray 2 sprays by Each Nostril route daily 16 g 5     No current facility-administered medications for this visit. Past Medical History:   Diagnosis Date    Essential hypertension, benign        No past surgical history on file. No family history on file. Social History     Socioeconomic History    Marital status:    Tobacco Use    Smoking status: Former     Packs/day: 0.75     Types: Cigarettes     Quit date: 2019     Years since quittin.6    Smokeless tobacco: Never   Substance and Sexual Activity    Alcohol use:  No

## 2023-11-03 ENCOUNTER — TELEPHONE (OUTPATIENT)
Age: 63
End: 2023-11-03

## 2023-11-03 NOTE — TELEPHONE ENCOUNTER
PT came in at 12:30 today and wanted to talk with his provider, which he didn't know who it was to begin with. Wouldn't disclose why and nor did he want to pay a co-payment to see her. PT number [phone].

## 2023-11-08 ENCOUNTER — TELEPHONE (OUTPATIENT)
Age: 63
End: 2023-11-08

## 2024-02-05 ENCOUNTER — SCHEDULED TELEPHONE ENCOUNTER (OUTPATIENT)
Age: 64
End: 2024-02-05
Payer: COMMERCIAL

## 2024-02-05 ENCOUNTER — NURSE ONLY (OUTPATIENT)
Age: 64
End: 2024-02-05
Payer: COMMERCIAL

## 2024-02-05 DIAGNOSIS — R52 GENERALIZED BODY ACHES: Primary | ICD-10-CM

## 2024-02-05 DIAGNOSIS — J11.1 FLU: Primary | ICD-10-CM

## 2024-02-05 DIAGNOSIS — R05.1 ACUTE COUGH: ICD-10-CM

## 2024-02-05 LAB
EXP DATE SOLUTION: NORMAL
EXP DATE SWAB: NORMAL
EXPIRATION DATE: NORMAL
INFLUENZA A ANTIGEN, POC: POSITIVE
INFLUENZA B ANTIGEN, POC: NEGATIVE
LOT NUMBER POC: NORMAL
LOT NUMBER SOLUTION: NORMAL
LOT NUMBER SWAB: NORMAL
SARS-COV-2 RNA, POC: NEGATIVE
VALID INTERNAL CONTROL, POC: ABNORMAL

## 2024-02-05 PROCEDURE — 99442 PR PHYS/QHP TELEPHONE EVALUATION 11-20 MIN: CPT | Performed by: NURSE PRACTITIONER

## 2024-02-05 PROCEDURE — 87502 INFLUENZA DNA AMP PROBE: CPT

## 2024-02-05 PROCEDURE — 87635 SARS-COV-2 COVID-19 AMP PRB: CPT

## 2024-02-05 ASSESSMENT — PATIENT HEALTH QUESTIONNAIRE - PHQ9
1. LITTLE INTEREST OR PLEASURE IN DOING THINGS: 0
2. FEELING DOWN, DEPRESSED OR HOPELESS: 0
SUM OF ALL RESPONSES TO PHQ9 QUESTIONS 1 & 2: 0
SUM OF ALL RESPONSES TO PHQ QUESTIONS 1-9: 0

## 2024-02-05 NOTE — PROGRESS NOTES
\"Have you been to the ER, urgent care clinic since your last visit?  Hospitalized since your last visit?\"    NO    “Have you seen or consulted any other health care providers outside of [facility] since your last visit?”    NO    “Have you had a colorectal cancer screening such as a colonoscopy/FIT/Cologuard?    NO         
2/22/23   Automatic Reconciliation, Ar   diclofenac (VOLTAREN) 75 MG EC tablet Take 1 tablet by mouth 2 times daily 5/1/23   Automatic Reconciliation, Ar   lisinopril-hydroCHLOROthiazide (PRINZIDE;ZESTORETIC) 20-25 MG per tablet Take 1 tab daily (exact dose unknown- [facility]) 4/20/23   Automatic Reconciliation, Ar   meclizine (ANTIVERT) 12.5 MG tablet Take 1 tablet by mouth 3 times daily as needed 7/27/20   Automatic Reconciliation, Ar   metoprolol tartrate (LOPRESSOR) 50 MG tablet Take 1 tablet by mouth in the morning and 1 tablet in the evening. 9/24/20   Automatic Reconciliation, Ar   omeprazole (PRILOSEC OTC) 20 MG tablet Take 1 tablet by mouth daily    Automatic Reconciliation, Ar       Review of Systems    No other pertinent positive symptoms.     Joe Denson was evaluated through a patient-initiated, synchronous (real-time) audio only encounter. He (or guardian if applicable) is aware that it is a billable service, which includes applicable co-pays, with coverage as determined by his insurance carrier. This visit was conducted with the patient's (and/or legan guardian's) verbal consent. He has not had a related appointment within my department in the past 7 days or scheduled within the next 24 hours. The patient was located in a state where the provider was licensed to provide care.  The patient was located at: Home: [address] [place] [phone]  The provider was located at: Facility (Appt Dept): [address] [place] [phone]    Total Time: minutes: 11-20 minutes    ROBERTO Romero NP

## 2024-02-12 ENCOUNTER — TELEPHONE (OUTPATIENT)
Age: 64
End: 2024-02-12

## 2024-02-12 NOTE — TELEPHONE ENCOUNTER
I would recommend OTC Imodium and to gain his weight back he should start drinking protein shakes daily such as boost or ensure. F/U prn if symptoms worsen or do not improve.

## 2024-02-12 NOTE — TELEPHONE ENCOUNTER
Tested positive for the flu last week. Patient states he has lost 11 pounds in 7 days. Has diarrhea really bad and wants something called in for him.

## 2024-08-14 ENCOUNTER — TELEPHONE (OUTPATIENT)
Age: 64
End: 2024-08-14

## 2024-08-14 NOTE — TELEPHONE ENCOUNTER
Patient came by yesterday stating he had a hip replacement not long ago and is in need of a [employer] parking placard. He hasn't been seen in 6 months. Should he make a appt or get this from whomever did the surgery?

## 2024-08-20 ENCOUNTER — OFFICE VISIT (OUTPATIENT)
Age: 64
End: 2024-08-20

## 2024-08-20 VITALS
WEIGHT: 184.13 LBS | HEIGHT: 67 IN | TEMPERATURE: 97.9 F | DIASTOLIC BLOOD PRESSURE: 77 MMHG | RESPIRATION RATE: 18 BRPM | SYSTOLIC BLOOD PRESSURE: 123 MMHG | HEART RATE: 86 BPM | BODY MASS INDEX: 28.9 KG/M2 | OXYGEN SATURATION: 97 %

## 2024-08-20 DIAGNOSIS — M25.551 PAIN IN RIGHT HIP: ICD-10-CM

## 2024-08-20 DIAGNOSIS — Z02.89 ENCOUNTER FOR COMPLETION OF FORM WITH PATIENT: Primary | ICD-10-CM

## 2024-08-20 RX ORDER — TRAMADOL HYDROCHLORIDE 50 MG/1
50-100 TABLET ORAL EVERY 6 HOURS PRN
COMMUNITY
Start: 2024-07-11

## 2024-08-20 RX ORDER — CELECOXIB 200 MG/1
CAPSULE ORAL
COMMUNITY
Start: 2024-07-11

## 2024-08-20 RX ORDER — CEFADROXIL 500 MG/1
CAPSULE ORAL
COMMUNITY
Start: 2024-07-11

## 2024-08-20 SDOH — ECONOMIC STABILITY: FOOD INSECURITY: WITHIN THE PAST 12 MONTHS, THE FOOD YOU BOUGHT JUST DIDN'T LAST AND YOU DIDN'T HAVE MONEY TO GET MORE.: NEVER TRUE

## 2024-08-20 SDOH — ECONOMIC STABILITY: INCOME INSECURITY: HOW HARD IS IT FOR YOU TO PAY FOR THE VERY BASICS LIKE FOOD, HOUSING, MEDICAL CARE, AND HEATING?: NOT VERY HARD

## 2024-08-20 SDOH — ECONOMIC STABILITY: FOOD INSECURITY: WITHIN THE PAST 12 MONTHS, YOU WORRIED THAT YOUR FOOD WOULD RUN OUT BEFORE YOU GOT MONEY TO BUY MORE.: NEVER TRUE

## 2024-08-20 ASSESSMENT — PATIENT HEALTH QUESTIONNAIRE - PHQ9
1. LITTLE INTEREST OR PLEASURE IN DOING THINGS: NOT AT ALL
SUM OF ALL RESPONSES TO PHQ QUESTIONS 1-9: 0
SUM OF ALL RESPONSES TO PHQ QUESTIONS 1-9: 0
2. FEELING DOWN, DEPRESSED OR HOPELESS: NOT AT ALL
SUM OF ALL RESPONSES TO PHQ QUESTIONS 1-9: 0
SUM OF ALL RESPONSES TO PHQ QUESTIONS 1-9: 0
SUM OF ALL RESPONSES TO PHQ9 QUESTIONS 1 & 2: 0

## 2024-08-20 ASSESSMENT — ENCOUNTER SYMPTOMS
APNEA: 0
EYE ITCHING: 0
ABDOMINAL DISTENTION: 0
EYE DISCHARGE: 0

## 2024-08-20 NOTE — PROGRESS NOTES
Joe Denson is a 64 y.o. male who presents today with c/o   Chief Complaint   Patient presents with    Forms     dmv           Assessment/ Plan:       ASSESSMENT AND PLAN    1. Encounter for completion of form with patient  Handicap form completed  Recommend follow up for labs in the next three months.  He is hoping to get back to work at the [employer] in the next few months.    Right hip pain  Continue with PT--s/p right hip arthroplasty          Return in about 3 months (around 11/20/2024) for labs.   Needs follow up for chronic problems and chronic labs. He tells me he will make an appointment.      Subjective:  Joe Denson is a 64 y.o. male here for paperwork completion. He recently had a right hip replacement with Dr. Michele Pat which he states went well. He is ambulating slowly and he is requesting another [employer] placard. He is still in PT. Has no other complaints today.   Offered to do labs for chronic problems, but he tells me he would like to come back for the labs.    Patient Active Problem List   Diagnosis    Contact dermatitis    Noncompliance    Elevated troponin    Essential hypertension, benign    Chest pain    Overweight    TMJ (temporomandibular joint disorder)    Ischial bursitis of right side    Chronic allergic rhinitis    Subacute pansinusitis       Past Medical History:   Diagnosis Date    Essential hypertension, benign 2015         Current Outpatient Medications:     cefadroxil (DURICEF) 500 MG capsule, , Disp: , Rfl:     celecoxib (CELEBREX) 200 MG capsule, , Disp: , Rfl:     traMADol (ULTRAM) 50 MG tablet, Take 1-2 tablets by mouth every 6 hours as needed., Disp: , Rfl:     aspirin 81 MG chewable tablet, Take 1 tablet by mouth daily, Disp: , Rfl:     lisinopril-hydroCHLOROthiazide (PRINZIDE;ZESTORETIC) 20-25 MG per tablet, Take 1 tab daily (exact dose unknown[facility]), Disp: , Rfl:     meclizine (ANTIVERT) 12.5 MG tablet, Take 1 tablet by mouth 3 times daily as needed, Disp: ,

## 2024-08-20 NOTE — PROGRESS NOTES
\"Have you been to the ER, urgent care clinic since your last visit?  Hospitalized since your last visit?\"    YES - When: approximately 1 months ago.  Where and Why: ortho surgery.    “Have you seen or consulted any other health care providers outside of [facility] since your last visit?”    NO        “Have you had a colorectal cancer screening such as a colonoscopy/FIT/Cologuard?    NO    No colonoscopy on file  No cologuard on file  No FIT/FOBT on file   No flexible sigmoidoscopy on file         Click Here for Release of Records Request

## 2024-09-06 ENCOUNTER — APPOINTMENT (OUTPATIENT)
Facility: HOSPITAL | Age: 64
DRG: 682 | End: 2024-09-06
Payer: COMMERCIAL

## 2024-09-06 ENCOUNTER — HOSPITAL ENCOUNTER (INPATIENT)
Facility: HOSPITAL | Age: 64
LOS: 2 days | Discharge: HOME | DRG: 682 | End: 2024-09-08
Attending: EMERGENCY MEDICINE | Admitting: INTERNAL MEDICINE
Payer: COMMERCIAL

## 2024-09-06 DIAGNOSIS — N17.9 AKI (ACUTE KIDNEY INJURY) (HCC): ICD-10-CM

## 2024-09-06 DIAGNOSIS — J18.9 PNEUMONIA OF RIGHT MIDDLE LOBE DUE TO INFECTIOUS ORGANISM: Primary | ICD-10-CM

## 2024-09-06 LAB
ALBUMIN SERPL-MCNC: 3.3 G/DL (ref 3.5–5)
ALBUMIN/GLOB SERPL: 0.7 (ref 1.1–2.2)
ALP SERPL-CCNC: 121 U/L (ref 45–117)
ALT SERPL-CCNC: 28 U/L (ref 12–78)
ANION GAP SERPL CALC-SCNC: 11 MMOL/L (ref 2–12)
AST SERPL-CCNC: 32 U/L (ref 15–37)
BASOPHILS # BLD: 0.1 K/UL (ref 0–0.1)
BASOPHILS NFR BLD: 0 % (ref 0–1)
BILIRUB SERPL-MCNC: 2.4 MG/DL (ref 0.2–1)
BUN SERPL-MCNC: 24 MG/DL (ref 6–20)
BUN/CREAT SERPL: 12 (ref 12–20)
CALCIUM SERPL-MCNC: 9.6 MG/DL (ref 8.5–10.1)
CHLORIDE SERPL-SCNC: 97 MMOL/L (ref 97–108)
CO2 SERPL-SCNC: 25 MMOL/L (ref 21–32)
CREAT SERPL-MCNC: 2.07 MG/DL (ref 0.7–1.3)
DIFFERENTIAL METHOD BLD: ABNORMAL
EOSINOPHIL # BLD: 0 K/UL (ref 0–0.4)
EOSINOPHIL NFR BLD: 0 % (ref 0–7)
ERYTHROCYTE [DISTWIDTH] IN BLOOD BY AUTOMATED COUNT: 13.5 % (ref 11.5–14.5)
FLUAV RNA SPEC QL NAA+PROBE: NOT DETECTED
FLUBV RNA SPEC QL NAA+PROBE: NOT DETECTED
GLOBULIN SER CALC-MCNC: 4.8 G/DL (ref 2–4)
GLUCOSE SERPL-MCNC: 123 MG/DL (ref 65–100)
HCT VFR BLD AUTO: 42.4 % (ref 36.6–50.3)
HGB BLD-MCNC: 13.9 G/DL (ref 12.1–17)
IMM GRANULOCYTES # BLD AUTO: 0.3 K/UL (ref 0–0.04)
IMM GRANULOCYTES NFR BLD AUTO: 1 % (ref 0–0.5)
LACTATE SERPL-SCNC: 1.6 MMOL/L (ref 0.4–2)
LYMPHOCYTES # BLD: 1.8 K/UL (ref 0.8–3.5)
LYMPHOCYTES NFR BLD: 9 % (ref 12–49)
MCH RBC QN AUTO: 27.3 PG (ref 26–34)
MCHC RBC AUTO-ENTMCNC: 32.8 G/DL (ref 30–36.5)
MCV RBC AUTO: 83.1 FL (ref 80–99)
MONOCYTES # BLD: 1.8 K/UL (ref 0–1)
MONOCYTES NFR BLD: 8 % (ref 5–13)
NEUTS SEG # BLD: 17 K/UL (ref 1.8–8)
NEUTS SEG NFR BLD: 81 % (ref 32–75)
NRBC # BLD: 0 K/UL (ref 0–0.01)
NRBC BLD-RTO: 0 PER 100 WBC
PLATELET # BLD AUTO: 283 K/UL (ref 150–400)
PMV BLD AUTO: 10 FL (ref 8.9–12.9)
POTASSIUM SERPL-SCNC: 3.4 MMOL/L (ref 3.5–5.1)
PROCALCITONIN SERPL-MCNC: 5.36 NG/ML
PROT SERPL-MCNC: 8.1 G/DL (ref 6.4–8.2)
RBC # BLD AUTO: 5.1 M/UL (ref 4.1–5.7)
SARS-COV-2 RNA RESP QL NAA+PROBE: NOT DETECTED
SODIUM SERPL-SCNC: 133 MMOL/L (ref 136–145)
SOURCE: NORMAL
TROPONIN I SERPL HS-MCNC: 73 NG/L (ref 0–76)
WBC # BLD AUTO: 20.9 K/UL (ref 4.1–11.1)

## 2024-09-06 PROCEDURE — 6360000002 HC RX W HCPCS: Performed by: EMERGENCY MEDICINE

## 2024-09-06 PROCEDURE — 96367 TX/PROPH/DG ADDL SEQ IV INF: CPT

## 2024-09-06 PROCEDURE — 71045 X-RAY EXAM CHEST 1 VIEW: CPT

## 2024-09-06 PROCEDURE — 87636 SARSCOV2 & INF A&B AMP PRB: CPT

## 2024-09-06 PROCEDURE — 80053 COMPREHEN METABOLIC PANEL: CPT

## 2024-09-06 PROCEDURE — 84145 PROCALCITONIN (PCT): CPT

## 2024-09-06 PROCEDURE — 97110 THERAPEUTIC EXERCISES: CPT

## 2024-09-06 PROCEDURE — 84484 ASSAY OF TROPONIN QUANT: CPT

## 2024-09-06 PROCEDURE — 6370000000 HC RX 637 (ALT 250 FOR IP): Performed by: INTERNAL MEDICINE

## 2024-09-06 PROCEDURE — 36415 COLL VENOUS BLD VENIPUNCTURE: CPT

## 2024-09-06 PROCEDURE — 96365 THER/PROPH/DIAG IV INF INIT: CPT

## 2024-09-06 PROCEDURE — 85025 COMPLETE CBC W/AUTO DIFF WBC: CPT

## 2024-09-06 PROCEDURE — 87040 BLOOD CULTURE FOR BACTERIA: CPT

## 2024-09-06 PROCEDURE — 1100000000 HC RM PRIVATE

## 2024-09-06 PROCEDURE — 93005 ELECTROCARDIOGRAM TRACING: CPT

## 2024-09-06 PROCEDURE — 97161 PT EVAL LOW COMPLEX 20 MIN: CPT

## 2024-09-06 PROCEDURE — 99285 EMERGENCY DEPT VISIT HI MDM: CPT

## 2024-09-06 PROCEDURE — 6360000002 HC RX W HCPCS: Performed by: INTERNAL MEDICINE

## 2024-09-06 PROCEDURE — 83605 ASSAY OF LACTIC ACID: CPT

## 2024-09-06 PROCEDURE — 2580000003 HC RX 258: Performed by: EMERGENCY MEDICINE

## 2024-09-06 RX ORDER — 0.9 % SODIUM CHLORIDE 0.9 %
30 INTRAVENOUS SOLUTION INTRAVENOUS ONCE
Status: COMPLETED | OUTPATIENT
Start: 2024-09-06 | End: 2024-09-06

## 2024-09-06 RX ORDER — ONDANSETRON 2 MG/ML
4 INJECTION INTRAMUSCULAR; INTRAVENOUS EVERY 6 HOURS PRN
Status: DISCONTINUED | OUTPATIENT
Start: 2024-09-06 | End: 2024-09-08 | Stop reason: HOSPADM

## 2024-09-06 RX ORDER — POLYETHYLENE GLYCOL 3350 17 G/17G
17 POWDER, FOR SOLUTION ORAL DAILY PRN
Status: DISCONTINUED | OUTPATIENT
Start: 2024-09-06 | End: 2024-09-08 | Stop reason: HOSPADM

## 2024-09-06 RX ORDER — ALBUTEROL SULFATE 0.83 MG/ML
2.5 SOLUTION RESPIRATORY (INHALATION)
Status: DISCONTINUED | OUTPATIENT
Start: 2024-09-06 | End: 2024-09-08 | Stop reason: HOSPADM

## 2024-09-06 RX ORDER — ENOXAPARIN SODIUM 100 MG/ML
40 INJECTION SUBCUTANEOUS DAILY
Status: DISCONTINUED | OUTPATIENT
Start: 2024-09-06 | End: 2024-09-08 | Stop reason: HOSPADM

## 2024-09-06 RX ORDER — ACETAMINOPHEN 650 MG/1
650 SUPPOSITORY RECTAL EVERY 6 HOURS PRN
Status: DISCONTINUED | OUTPATIENT
Start: 2024-09-06 | End: 2024-09-08 | Stop reason: HOSPADM

## 2024-09-06 RX ORDER — ACETAMINOPHEN 325 MG/1
650 TABLET ORAL EVERY 6 HOURS PRN
Status: DISCONTINUED | OUTPATIENT
Start: 2024-09-06 | End: 2024-09-08 | Stop reason: HOSPADM

## 2024-09-06 RX ORDER — SODIUM CHLORIDE AND POTASSIUM CHLORIDE 150; 900 MG/100ML; MG/100ML
INJECTION, SOLUTION INTRAVENOUS CONTINUOUS
Status: DISCONTINUED | OUTPATIENT
Start: 2024-09-06 | End: 2024-09-08 | Stop reason: HOSPADM

## 2024-09-06 RX ORDER — SODIUM CHLORIDE 9 MG/ML
INJECTION, SOLUTION INTRAVENOUS PRN
Status: DISCONTINUED | OUTPATIENT
Start: 2024-09-06 | End: 2024-09-08 | Stop reason: HOSPADM

## 2024-09-06 RX ORDER — PANTOPRAZOLE SODIUM 40 MG/1
40 TABLET, DELAYED RELEASE ORAL
Status: DISCONTINUED | OUTPATIENT
Start: 2024-09-07 | End: 2024-09-08 | Stop reason: HOSPADM

## 2024-09-06 RX ORDER — SODIUM CHLORIDE 0.9 % (FLUSH) 0.9 %
5-40 SYRINGE (ML) INJECTION PRN
Status: DISCONTINUED | OUTPATIENT
Start: 2024-09-06 | End: 2024-09-08 | Stop reason: HOSPADM

## 2024-09-06 RX ORDER — METOPROLOL TARTRATE 50 MG
50 TABLET ORAL EVERY 12 HOURS
Status: DISCONTINUED | OUTPATIENT
Start: 2024-09-06 | End: 2024-09-07

## 2024-09-06 RX ORDER — SODIUM CHLORIDE 0.9 % (FLUSH) 0.9 %
5-40 SYRINGE (ML) INJECTION EVERY 12 HOURS SCHEDULED
Status: DISCONTINUED | OUTPATIENT
Start: 2024-09-06 | End: 2024-09-08 | Stop reason: HOSPADM

## 2024-09-06 RX ORDER — ASPIRIN 81 MG/1
81 TABLET, CHEWABLE ORAL DAILY
Status: DISCONTINUED | OUTPATIENT
Start: 2024-09-06 | End: 2024-09-08 | Stop reason: HOSPADM

## 2024-09-06 RX ORDER — AZITHROMYCIN 250 MG/1
500 TABLET, FILM COATED ORAL EVERY 24 HOURS
Status: DISCONTINUED | OUTPATIENT
Start: 2024-09-07 | End: 2024-09-08 | Stop reason: HOSPADM

## 2024-09-06 RX ORDER — ONDANSETRON 4 MG/1
4 TABLET, ORALLY DISINTEGRATING ORAL EVERY 8 HOURS PRN
Status: DISCONTINUED | OUTPATIENT
Start: 2024-09-06 | End: 2024-09-08 | Stop reason: HOSPADM

## 2024-09-06 RX ADMIN — ACETAMINOPHEN 650 MG: 325 TABLET ORAL at 15:27

## 2024-09-06 RX ADMIN — PIPERACILLIN AND TAZOBACTAM 4500 MG: 4; .5 INJECTION, POWDER, LYOPHILIZED, FOR SOLUTION INTRAVENOUS at 12:08

## 2024-09-06 RX ADMIN — SODIUM CHLORIDE 2000 MG: 9 INJECTION, SOLUTION INTRAVENOUS at 13:07

## 2024-09-06 RX ADMIN — SODIUM CHLORIDE 2490 ML: 9 INJECTION, SOLUTION INTRAVENOUS at 11:50

## 2024-09-06 RX ADMIN — POTASSIUM CHLORIDE AND SODIUM CHLORIDE: 900; 150 INJECTION, SOLUTION INTRAVENOUS at 16:52

## 2024-09-06 RX ADMIN — ASPIRIN 81 MG: 81 TABLET, CHEWABLE ORAL at 15:27

## 2024-09-06 ASSESSMENT — LIFESTYLE VARIABLES
HOW MANY STANDARD DRINKS CONTAINING ALCOHOL DO YOU HAVE ON A TYPICAL DAY: PATIENT DECLINED
HOW OFTEN DO YOU HAVE A DRINK CONTAINING ALCOHOL: PATIENT DECLINED

## 2024-09-06 ASSESSMENT — PAIN SCALES - GENERAL
PAINLEVEL_OUTOF10: 3
PAINLEVEL_OUTOF10: 7
PAINLEVEL_OUTOF10: 0

## 2024-09-06 ASSESSMENT — PAIN DESCRIPTION - LOCATION: LOCATION: BACK

## 2024-09-06 ASSESSMENT — PAIN DESCRIPTION - ORIENTATION: ORIENTATION: LOWER

## 2024-09-06 ASSESSMENT — PAIN DESCRIPTION - DESCRIPTORS: DESCRIPTORS: ACHING

## 2024-09-07 LAB
ANION GAP SERPL CALC-SCNC: 10 MMOL/L (ref 2–12)
BASOPHILS # BLD: 0.1 K/UL (ref 0–0.1)
BASOPHILS NFR BLD: 0 % (ref 0–1)
BUN SERPL-MCNC: 23 MG/DL (ref 6–20)
BUN/CREAT SERPL: 17 (ref 12–20)
CALCIUM SERPL-MCNC: 8.7 MG/DL (ref 8.5–10.1)
CHLORIDE SERPL-SCNC: 105 MMOL/L (ref 97–108)
CO2 SERPL-SCNC: 26 MMOL/L (ref 21–32)
CREAT SERPL-MCNC: 1.37 MG/DL (ref 0.7–1.3)
DIFFERENTIAL METHOD BLD: ABNORMAL
EOSINOPHIL # BLD: 0 K/UL (ref 0–0.4)
EOSINOPHIL NFR BLD: 0 % (ref 0–7)
ERYTHROCYTE [DISTWIDTH] IN BLOOD BY AUTOMATED COUNT: 13.8 % (ref 11.5–14.5)
GLUCOSE SERPL-MCNC: 88 MG/DL (ref 65–100)
HCT VFR BLD AUTO: 38 % (ref 36.6–50.3)
HGB BLD-MCNC: 12.4 G/DL (ref 12.1–17)
IMM GRANULOCYTES # BLD AUTO: 0.1 K/UL (ref 0–0.04)
IMM GRANULOCYTES NFR BLD AUTO: 1 % (ref 0–0.5)
LYMPHOCYTES # BLD: 2.5 K/UL (ref 0.8–3.5)
LYMPHOCYTES NFR BLD: 15 % (ref 12–49)
MCH RBC QN AUTO: 27.1 PG (ref 26–34)
MCHC RBC AUTO-ENTMCNC: 32.6 G/DL (ref 30–36.5)
MCV RBC AUTO: 83.2 FL (ref 80–99)
MONOCYTES # BLD: 1.6 K/UL (ref 0–1)
MONOCYTES NFR BLD: 10 % (ref 5–13)
NEUTS SEG # BLD: 12.3 K/UL (ref 1.8–8)
NEUTS SEG NFR BLD: 74 % (ref 32–75)
NRBC # BLD: 0 K/UL (ref 0–0.01)
NRBC BLD-RTO: 0 PER 100 WBC
PLATELET # BLD AUTO: 254 K/UL (ref 150–400)
PMV BLD AUTO: 10.4 FL (ref 8.9–12.9)
POTASSIUM SERPL-SCNC: 3.8 MMOL/L (ref 3.5–5.1)
RBC # BLD AUTO: 4.57 M/UL (ref 4.1–5.7)
SODIUM SERPL-SCNC: 141 MMOL/L (ref 136–145)
WBC # BLD AUTO: 16.6 K/UL (ref 4.1–11.1)

## 2024-09-07 PROCEDURE — 36415 COLL VENOUS BLD VENIPUNCTURE: CPT

## 2024-09-07 PROCEDURE — 94760 N-INVAS EAR/PLS OXIMETRY 1: CPT

## 2024-09-07 PROCEDURE — 85025 COMPLETE CBC W/AUTO DIFF WBC: CPT

## 2024-09-07 PROCEDURE — 2580000003 HC RX 258: Performed by: INTERNAL MEDICINE

## 2024-09-07 PROCEDURE — 1100000000 HC RM PRIVATE

## 2024-09-07 PROCEDURE — 80048 BASIC METABOLIC PNL TOTAL CA: CPT

## 2024-09-07 PROCEDURE — 6360000002 HC RX W HCPCS: Performed by: INTERNAL MEDICINE

## 2024-09-07 PROCEDURE — 6370000000 HC RX 637 (ALT 250 FOR IP): Performed by: INTERNAL MEDICINE

## 2024-09-07 RX ORDER — AMLODIPINE BESYLATE 10 MG/1
10 TABLET ORAL DAILY
Status: DISCONTINUED | OUTPATIENT
Start: 2024-09-07 | End: 2024-09-08 | Stop reason: HOSPADM

## 2024-09-07 RX ORDER — METOPROLOL SUCCINATE 50 MG/1
25 TABLET, EXTENDED RELEASE ORAL DAILY
Status: ON HOLD | COMMUNITY
End: 2024-09-08 | Stop reason: HOSPADM

## 2024-09-07 RX ORDER — AMLODIPINE BESYLATE 10 MG/1
10 TABLET ORAL DAILY
Status: ON HOLD | COMMUNITY
End: 2024-09-08 | Stop reason: HOSPADM

## 2024-09-07 RX ORDER — METOPROLOL SUCCINATE 25 MG/1
25 TABLET, EXTENDED RELEASE ORAL DAILY
Status: DISCONTINUED | OUTPATIENT
Start: 2024-09-07 | End: 2024-09-08 | Stop reason: HOSPADM

## 2024-09-07 RX ADMIN — AZITHROMYCIN DIHYDRATE 500 MG: 250 TABLET ORAL at 08:27

## 2024-09-07 RX ADMIN — ASPIRIN 81 MG: 81 TABLET, CHEWABLE ORAL at 08:27

## 2024-09-07 RX ADMIN — POTASSIUM CHLORIDE AND SODIUM CHLORIDE: 900; 150 INJECTION, SOLUTION INTRAVENOUS at 14:25

## 2024-09-07 RX ADMIN — POTASSIUM CHLORIDE AND SODIUM CHLORIDE 100 ML/HR: 900; 150 INJECTION, SOLUTION INTRAVENOUS at 03:45

## 2024-09-07 RX ADMIN — ACETAMINOPHEN 650 MG: 325 TABLET ORAL at 20:31

## 2024-09-07 RX ADMIN — ACETAMINOPHEN 650 MG: 325 TABLET ORAL at 01:16

## 2024-09-07 RX ADMIN — PANTOPRAZOLE SODIUM 40 MG: 40 TABLET, DELAYED RELEASE ORAL at 05:18

## 2024-09-07 RX ADMIN — METOPROLOL SUCCINATE 25 MG: 25 TABLET, FILM COATED, EXTENDED RELEASE ORAL at 14:08

## 2024-09-07 RX ADMIN — WATER 1000 MG: 1 INJECTION INTRAMUSCULAR; INTRAVENOUS; SUBCUTANEOUS at 08:27

## 2024-09-07 ASSESSMENT — PAIN SCALES - GENERAL
PAINLEVEL_OUTOF10: 3
PAINLEVEL_OUTOF10: 3
PAINLEVEL_OUTOF10: 0
PAINLEVEL_OUTOF10: 1
PAINLEVEL_OUTOF10: 0

## 2024-09-07 ASSESSMENT — PAIN DESCRIPTION - ORIENTATION: ORIENTATION: LOWER

## 2024-09-07 ASSESSMENT — PAIN DESCRIPTION - LOCATION
LOCATION: BACK
LOCATION: BACK

## 2024-09-07 ASSESSMENT — PAIN DESCRIPTION - DESCRIPTORS
DESCRIPTORS: ACHING
DESCRIPTORS: STABBING

## 2024-09-08 VITALS
RESPIRATION RATE: 20 BRPM | BODY MASS INDEX: 29.24 KG/M2 | HEIGHT: 67 IN | DIASTOLIC BLOOD PRESSURE: 82 MMHG | TEMPERATURE: 98.1 F | OXYGEN SATURATION: 100 % | HEART RATE: 75 BPM | SYSTOLIC BLOOD PRESSURE: 139 MMHG | WEIGHT: 186.3 LBS

## 2024-09-08 PROBLEM — M19.90 OSTEOARTHRITIS: Chronic | Status: ACTIVE | Noted: 2024-09-08

## 2024-09-08 PROBLEM — J18.9 PNEUMONIA: Status: ACTIVE | Noted: 2024-09-08

## 2024-09-08 LAB
ANION GAP SERPL CALC-SCNC: 10 MMOL/L (ref 2–12)
BASOPHILS # BLD: 0 K/UL (ref 0–0.1)
BASOPHILS NFR BLD: 0 % (ref 0–1)
BILIRUB SERPL-MCNC: 0.6 MG/DL (ref 0.2–1)
BUN SERPL-MCNC: 19 MG/DL (ref 6–20)
BUN/CREAT SERPL: 16 (ref 12–20)
CALCIUM SERPL-MCNC: 8.6 MG/DL (ref 8.5–10.1)
CHLORIDE SERPL-SCNC: 106 MMOL/L (ref 97–108)
CO2 SERPL-SCNC: 26 MMOL/L (ref 21–32)
CREAT SERPL-MCNC: 1.17 MG/DL (ref 0.7–1.3)
DIFFERENTIAL METHOD BLD: ABNORMAL
EOSINOPHIL # BLD: 0.1 K/UL (ref 0–0.4)
EOSINOPHIL NFR BLD: 2 % (ref 0–7)
ERYTHROCYTE [DISTWIDTH] IN BLOOD BY AUTOMATED COUNT: 14 % (ref 11.5–14.5)
GLUCOSE SERPL-MCNC: 89 MG/DL (ref 65–100)
HCT VFR BLD AUTO: 35.2 % (ref 36.6–50.3)
HGB BLD-MCNC: 11.8 G/DL (ref 12.1–17)
IMM GRANULOCYTES # BLD AUTO: 0.1 K/UL (ref 0–0.04)
IMM GRANULOCYTES NFR BLD AUTO: 1 % (ref 0–0.5)
LYMPHOCYTES # BLD: 2 K/UL (ref 0.8–3.5)
LYMPHOCYTES NFR BLD: 23 % (ref 12–49)
MCH RBC QN AUTO: 27.1 PG (ref 26–34)
MCHC RBC AUTO-ENTMCNC: 33.5 G/DL (ref 30–36.5)
MCV RBC AUTO: 80.7 FL (ref 80–99)
MONOCYTES # BLD: 1.1 K/UL (ref 0–1)
MONOCYTES NFR BLD: 13 % (ref 5–13)
NEUTS SEG # BLD: 5.1 K/UL (ref 1.8–8)
NEUTS SEG NFR BLD: 61 % (ref 32–75)
NRBC # BLD: 0 K/UL (ref 0–0.01)
NRBC BLD-RTO: 0 PER 100 WBC
PLATELET # BLD AUTO: 280 K/UL (ref 150–400)
PMV BLD AUTO: 10.5 FL (ref 8.9–12.9)
POTASSIUM SERPL-SCNC: 4 MMOL/L (ref 3.5–5.1)
PROCALCITONIN SERPL-MCNC: 2.4 NG/ML
RBC # BLD AUTO: 4.36 M/UL (ref 4.1–5.7)
SODIUM SERPL-SCNC: 142 MMOL/L (ref 136–145)
WBC # BLD AUTO: 8.4 K/UL (ref 4.1–11.1)

## 2024-09-08 PROCEDURE — 36415 COLL VENOUS BLD VENIPUNCTURE: CPT

## 2024-09-08 PROCEDURE — 85025 COMPLETE CBC W/AUTO DIFF WBC: CPT

## 2024-09-08 PROCEDURE — 84145 PROCALCITONIN (PCT): CPT

## 2024-09-08 PROCEDURE — 6360000002 HC RX W HCPCS: Performed by: INTERNAL MEDICINE

## 2024-09-08 PROCEDURE — 2580000003 HC RX 258: Performed by: INTERNAL MEDICINE

## 2024-09-08 PROCEDURE — 6370000000 HC RX 637 (ALT 250 FOR IP): Performed by: INTERNAL MEDICINE

## 2024-09-08 PROCEDURE — 82247 BILIRUBIN TOTAL: CPT

## 2024-09-08 PROCEDURE — 94760 N-INVAS EAR/PLS OXIMETRY 1: CPT

## 2024-09-08 PROCEDURE — 80048 BASIC METABOLIC PNL TOTAL CA: CPT

## 2024-09-08 RX ORDER — AZITHROMYCIN 500 MG/1
500 TABLET, FILM COATED ORAL DAILY
Qty: 3 TABLET | Refills: 0 | Status: SHIPPED | OUTPATIENT
Start: 2024-09-08 | End: 2024-09-11

## 2024-09-08 RX ORDER — METOPROLOL SUCCINATE 50 MG/1
25 TABLET, EXTENDED RELEASE ORAL DAILY
Qty: 15 TABLET | Refills: 0 | Status: SHIPPED
Start: 2024-09-09

## 2024-09-08 RX ORDER — CEFUROXIME AXETIL 500 MG/1
500 TABLET ORAL 2 TIMES DAILY
Qty: 10 TABLET | Refills: 0 | Status: SHIPPED | OUTPATIENT
Start: 2024-09-08 | End: 2024-09-13

## 2024-09-08 RX ADMIN — PANTOPRAZOLE SODIUM 40 MG: 40 TABLET, DELAYED RELEASE ORAL at 06:36

## 2024-09-08 RX ADMIN — METOPROLOL SUCCINATE 25 MG: 25 TABLET, FILM COATED, EXTENDED RELEASE ORAL at 08:22

## 2024-09-08 RX ADMIN — POTASSIUM CHLORIDE AND SODIUM CHLORIDE: 900; 150 INJECTION, SOLUTION INTRAVENOUS at 00:59

## 2024-09-08 RX ADMIN — AZITHROMYCIN DIHYDRATE 500 MG: 250 TABLET ORAL at 08:23

## 2024-09-08 RX ADMIN — WATER 1000 MG: 1 INJECTION INTRAMUSCULAR; INTRAVENOUS; SUBCUTANEOUS at 08:23

## 2024-09-08 RX ADMIN — SODIUM CHLORIDE, PRESERVATIVE FREE 10 ML: 5 INJECTION INTRAVENOUS at 08:27

## 2024-09-08 RX ADMIN — ASPIRIN 81 MG: 81 TABLET, CHEWABLE ORAL at 08:22

## 2024-09-11 LAB
EKG ATRIAL RATE: 95 BPM
EKG DIAGNOSIS: NORMAL
EKG P AXIS: 51 DEGREES
EKG P-R INTERVAL: 124 MS
EKG Q-T INTERVAL: 326 MS
EKG QRS DURATION: 78 MS
EKG QTC CALCULATION (BAZETT): 409 MS
EKG R AXIS: 19 DEGREES
EKG T AXIS: 0 DEGREES
EKG VENTRICULAR RATE: 95 BPM

## 2024-09-12 LAB
BACTERIA SPEC CULT: NORMAL
BACTERIA SPEC CULT: NORMAL
SERVICE CMNT-IMP: NORMAL
SERVICE CMNT-IMP: NORMAL

## 2024-09-17 ENCOUNTER — OFFICE VISIT (OUTPATIENT)
Age: 64
End: 2024-09-17

## 2024-09-17 VITALS
DIASTOLIC BLOOD PRESSURE: 84 MMHG | RESPIRATION RATE: 18 BRPM | BODY MASS INDEX: 28.56 KG/M2 | WEIGHT: 182 LBS | SYSTOLIC BLOOD PRESSURE: 160 MMHG | TEMPERATURE: 97 F | HEART RATE: 74 BPM | OXYGEN SATURATION: 97 % | HEIGHT: 67 IN

## 2024-09-17 DIAGNOSIS — J18.9 PNEUMONIA OF RIGHT UPPER LOBE DUE TO INFECTIOUS ORGANISM: ICD-10-CM

## 2024-09-17 DIAGNOSIS — I10 ESSENTIAL HYPERTENSION, BENIGN: Primary | ICD-10-CM

## 2024-09-17 DIAGNOSIS — N17.9 ACUTE RENAL FAILURE, UNSPECIFIED ACUTE RENAL FAILURE TYPE (HCC): ICD-10-CM

## 2024-09-17 RX ORDER — AMLODIPINE BESYLATE 10 MG/1
10 TABLET ORAL
COMMUNITY
Start: 2023-06-22 | End: 2024-09-17 | Stop reason: CLARIF

## 2024-09-17 RX ORDER — CEFUROXIME AXETIL 500 MG/1
500 TABLET ORAL 2 TIMES DAILY
COMMUNITY

## 2024-09-17 RX ORDER — CHLORPROMAZINE HYDROCHLORIDE 10 MG/1
10 TABLET, FILM COATED ORAL 3 TIMES DAILY PRN
COMMUNITY
Start: 2024-07-19 | End: 2024-09-17 | Stop reason: CLARIF

## 2024-09-17 ASSESSMENT — PATIENT HEALTH QUESTIONNAIRE - PHQ9
SUM OF ALL RESPONSES TO PHQ QUESTIONS 1-9: 0
SUM OF ALL RESPONSES TO PHQ QUESTIONS 1-9: 0
8. MOVING OR SPEAKING SO SLOWLY THAT OTHER PEOPLE COULD HAVE NOTICED. OR THE OPPOSITE, BEING SO FIGETY OR RESTLESS THAT YOU HAVE BEEN MOVING AROUND A LOT MORE THAN USUAL: NOT AT ALL
6. FEELING BAD ABOUT YOURSELF - OR THAT YOU ARE A FAILURE OR HAVE LET YOURSELF OR YOUR FAMILY DOWN: NOT AT ALL
2. FEELING DOWN, DEPRESSED OR HOPELESS: NOT AT ALL
SUM OF ALL RESPONSES TO PHQ QUESTIONS 1-9: 0
3. TROUBLE FALLING OR STAYING ASLEEP: NOT AT ALL
SUM OF ALL RESPONSES TO PHQ9 QUESTIONS 1 & 2: 0
9. THOUGHTS THAT YOU WOULD BE BETTER OFF DEAD, OR OF HURTING YOURSELF: NOT AT ALL
10. IF YOU CHECKED OFF ANY PROBLEMS, HOW DIFFICULT HAVE THESE PROBLEMS MADE IT FOR YOU TO DO YOUR WORK, TAKE CARE OF THINGS AT HOME, OR GET ALONG WITH OTHER PEOPLE: NOT DIFFICULT AT ALL
1. LITTLE INTEREST OR PLEASURE IN DOING THINGS: NOT AT ALL
7. TROUBLE CONCENTRATING ON THINGS, SUCH AS READING THE NEWSPAPER OR WATCHING TELEVISION: NOT AT ALL
4. FEELING TIRED OR HAVING LITTLE ENERGY: NOT AT ALL
SUM OF ALL RESPONSES TO PHQ QUESTIONS 1-9: 0
5. POOR APPETITE OR OVEREATING: NOT AT ALL

## 2024-09-17 ASSESSMENT — ENCOUNTER SYMPTOMS
EYES NEGATIVE: 1
CHEST TIGHTNESS: 0
SHORTNESS OF BREATH: 0
GASTROINTESTINAL NEGATIVE: 1
RESPIRATORY NEGATIVE: 1

## 2025-06-27 ENCOUNTER — OFFICE VISIT (OUTPATIENT)
Age: 65
End: 2025-06-27
Payer: COMMERCIAL

## 2025-06-27 ENCOUNTER — RESULTS FOLLOW-UP (OUTPATIENT)
Age: 65
End: 2025-06-27

## 2025-06-27 VITALS
BODY MASS INDEX: 30.98 KG/M2 | DIASTOLIC BLOOD PRESSURE: 82 MMHG | WEIGHT: 197.4 LBS | TEMPERATURE: 97.3 F | HEART RATE: 73 BPM | SYSTOLIC BLOOD PRESSURE: 130 MMHG | HEIGHT: 67 IN | RESPIRATION RATE: 18 BRPM | OXYGEN SATURATION: 96 %

## 2025-06-27 DIAGNOSIS — R06.02 SOB (SHORTNESS OF BREATH): Primary | ICD-10-CM

## 2025-06-27 PROCEDURE — 3079F DIAST BP 80-89 MM HG: CPT | Performed by: STUDENT IN AN ORGANIZED HEALTH CARE EDUCATION/TRAINING PROGRAM

## 2025-06-27 PROCEDURE — 99214 OFFICE O/P EST MOD 30 MIN: CPT | Performed by: STUDENT IN AN ORGANIZED HEALTH CARE EDUCATION/TRAINING PROGRAM

## 2025-06-27 PROCEDURE — 3075F SYST BP GE 130 - 139MM HG: CPT | Performed by: STUDENT IN AN ORGANIZED HEALTH CARE EDUCATION/TRAINING PROGRAM

## 2025-06-27 PROCEDURE — 93000 ELECTROCARDIOGRAM COMPLETE: CPT | Performed by: STUDENT IN AN ORGANIZED HEALTH CARE EDUCATION/TRAINING PROGRAM

## 2025-06-27 RX ORDER — LISINOPRIL AND HYDROCHLOROTHIAZIDE 20; 25 MG/1; MG/1
1 TABLET ORAL DAILY
COMMUNITY
Start: 2024-12-05

## 2025-06-27 RX ORDER — ALBUTEROL SULFATE 90 UG/1
2 INHALANT RESPIRATORY (INHALATION) 4 TIMES DAILY PRN
Qty: 54 G | Refills: 1 | Status: SHIPPED | OUTPATIENT
Start: 2025-06-27

## 2025-06-27 RX ORDER — BUDESONIDE AND FORMOTEROL FUMARATE DIHYDRATE 80; 4.5 UG/1; UG/1
2 AEROSOL RESPIRATORY (INHALATION) 2 TIMES DAILY
Qty: 10.2 G | Refills: 3 | Status: SHIPPED | OUTPATIENT
Start: 2025-06-27

## 2025-06-27 ASSESSMENT — PATIENT HEALTH QUESTIONNAIRE - PHQ9
SUM OF ALL RESPONSES TO PHQ QUESTIONS 1-9: 0
1. LITTLE INTEREST OR PLEASURE IN DOING THINGS: NOT AT ALL
SUM OF ALL RESPONSES TO PHQ QUESTIONS 1-9: 0
2. FEELING DOWN, DEPRESSED OR HOPELESS: NOT AT ALL

## 2025-06-27 NOTE — PROGRESS NOTES
Subjective:     Chief Complaint   Patient presents with    Breathing Problem     For months, getting worst     Wheezing       Joe Denson is a 65 y.o. male who presents today for follow up     History of Present Illness  The patient presents for evaluation of shortness of breath.    Shortness of breath  -Estimates shortness of breath has been present since COVID-19 infection in 2019  - Worsening for several months  - Particularly noticeable when ascending stairs or engaging in physical activity  - No chest pain, palpitation, edema.  - Experiences wheezing, which intensifies at night and when lying down  - Does not experience shortness of breath when wheezing  - Using albuterol inhaler as needed, which provides some relief.  He is office asking for refill and a second inhaler.  - Previously on prednisone for inflammation but discontinued after running out  - No history of heart problems, however [facility]  Has an upcoming appointment  - Stress test last year due to similar symptoms, which yielded negative results  -Had PFTs with some response to bronchodilator  - History of smoking, consuming about a pack a week for 15 years, but quit after julia COVID-19 in 2019  - Reports no chest pain, palpitations, or swelling  - He has not had any acute change in his symptoms.  Is at his baseline today would like an inhaler.  - [facility].  Was told these were normal last week.  No anemia, normal renal function.    Sleep apnea  - Reports snoring  - Sleep apnea screening scheduled for 07/12/2025    Weight gain  - Gained approximately 20 pounds  - Does not cough up any phlegm    Supplemental information: He had a colonoscopy last month, which was normal. He had a CAT scan because of his back arthritis and inflammation. He had a hip replacement.    SOCIAL HISTORY  The patient smoked about a pack a week for approximately 15 years but quit smoking after julia

## 2025-06-27 NOTE — PROGRESS NOTES
\"Have you been to the ER, urgent care clinic since your last visit?  Hospitalized since your last visit?\"    YES - When: approximately 9 months ago.  Where and Why: [facility] , Pneumonia.    “Have you seen or consulted any other health care providers outside of [facility] since your last visit?”    NO        “Have you had a colorectal cancer screening such as a colonoscopy/FIT/Cologuard?    YES - Type: Colonoscopy - Where: [facility] 05/2025 Nurse/CMA to request most recent records if not in the chart     No colonoscopy on file  No cologuard on file  No FIT/FOBT on file   No flexible sigmoidoscopy on file     Chief Complaint   Patient presents with    Breathing Problem     For months, getting worst     Wheezing       Vitals:    06/27/25 0804   BP: (!) 146/85   Pulse: 73   Resp: 18   Temp: 97.3 °F (36.3 °C)   SpO2: 96%       Click Here for Release of Records Request